# Patient Record
Sex: MALE | Race: WHITE | NOT HISPANIC OR LATINO | Employment: PART TIME | URBAN - METROPOLITAN AREA
[De-identification: names, ages, dates, MRNs, and addresses within clinical notes are randomized per-mention and may not be internally consistent; named-entity substitution may affect disease eponyms.]

---

## 2021-11-09 ENCOUNTER — OFFICE VISIT (OUTPATIENT)
Dept: BEHAVIORAL HEALTH | Facility: CLINIC | Age: 36
End: 2021-11-09
Payer: COMMERCIAL

## 2021-11-09 DIAGNOSIS — F33.1 MODERATE EPISODE OF RECURRENT MAJOR DEPRESSIVE DISORDER (HCC): ICD-10-CM

## 2021-11-09 PROCEDURE — 99204 OFFICE O/P NEW MOD 45 MIN: CPT | Mod: GC | Performed by: PSYCHIATRY & NEUROLOGY

## 2021-11-09 PROCEDURE — 96127 BRIEF EMOTIONAL/BEHAV ASSMT: CPT | Performed by: PSYCHIATRY & NEUROLOGY

## 2021-11-09 RX ORDER — DULOXETIN HYDROCHLORIDE 30 MG/1
30 CAPSULE, DELAYED RELEASE ORAL DAILY
Qty: 30 CAPSULE | Refills: 1 | Status: SHIPPED | OUTPATIENT
Start: 2021-11-09 | End: 2021-12-21 | Stop reason: SDUPTHER

## 2021-11-09 RX ORDER — DULOXETIN HYDROCHLORIDE 30 MG/1
CAPSULE, DELAYED RELEASE ORAL
Qty: 45 CAPSULE | Refills: 1 | Status: SHIPPED
Start: 2021-11-09 | End: 2021-11-09

## 2021-11-09 ASSESSMENT — PATIENT HEALTH QUESTIONNAIRE - PHQ9
5. POOR APPETITE OR OVEREATING: 1 - SEVERAL DAYS
CLINICAL INTERPRETATION OF PHQ2 SCORE: 6
SUM OF ALL RESPONSES TO PHQ QUESTIONS 1-9: 15

## 2021-11-09 ASSESSMENT — ANXIETY QUESTIONNAIRES
3. WORRYING TOO MUCH ABOUT DIFFERENT THINGS: MORE THAN HALF THE DAYS
7. FEELING AFRAID AS IF SOMETHING AWFUL MIGHT HAPPEN: NOT AT ALL
1. FEELING NERVOUS, ANXIOUS, OR ON EDGE: NEARLY EVERY DAY
4. TROUBLE RELAXING: NOT AT ALL
GAD7 TOTAL SCORE: 8
IF YOU CHECKED OFF ANY PROBLEMS ON THIS QUESTIONNAIRE, HOW DIFFICULT HAVE THESE PROBLEMS MADE IT FOR YOU TO DO YOUR WORK, TAKE CARE OF THINGS AT HOME, OR GET ALONG WITH OTHER PEOPLE: VERY DIFFICULT
5. BEING SO RESTLESS THAT IT IS HARD TO SIT STILL: NOT AT ALL
2. NOT BEING ABLE TO STOP OR CONTROL WORRYING: NOT AT ALL
6. BECOMING EASILY ANNOYED OR IRRITABLE: NEARLY EVERY DAY
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
## 2021-12-21 ENCOUNTER — OFFICE VISIT (OUTPATIENT)
Dept: BEHAVIORAL HEALTH | Facility: CLINIC | Age: 36
End: 2021-12-21
Payer: COMMERCIAL

## 2021-12-21 DIAGNOSIS — F33.1 MODERATE EPISODE OF RECURRENT MAJOR DEPRESSIVE DISORDER (HCC): ICD-10-CM

## 2021-12-21 PROCEDURE — 99213 OFFICE O/P EST LOW 20 MIN: CPT | Mod: GC | Performed by: PSYCHIATRY & NEUROLOGY

## 2021-12-21 RX ORDER — DULOXETIN HYDROCHLORIDE 30 MG/1
30 CAPSULE, DELAYED RELEASE ORAL DAILY
Qty: 90 CAPSULE | Refills: 0 | Status: SHIPPED
Start: 2021-12-21 | End: 2022-01-19
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
## 2021-12-24 ENCOUNTER — OFFICE VISIT (OUTPATIENT)
Dept: URGENT CARE | Facility: PHYSICIAN GROUP | Age: 36
End: 2021-12-24
Payer: COMMERCIAL

## 2021-12-24 VITALS
OXYGEN SATURATION: 100 % | DIASTOLIC BLOOD PRESSURE: 78 MMHG | WEIGHT: 179.4 LBS | HEART RATE: 98 BPM | BODY MASS INDEX: 25.68 KG/M2 | TEMPERATURE: 98.1 F | HEIGHT: 70 IN | SYSTOLIC BLOOD PRESSURE: 120 MMHG | RESPIRATION RATE: 12 BRPM

## 2021-12-24 DIAGNOSIS — N52.9 ERECTILE DYSFUNCTION, UNSPECIFIED ERECTILE DYSFUNCTION TYPE: ICD-10-CM

## 2021-12-24 DIAGNOSIS — L30.9 ECZEMA, UNSPECIFIED TYPE: ICD-10-CM

## 2021-12-24 PROCEDURE — 99203 OFFICE O/P NEW LOW 30 MIN: CPT | Performed by: FAMILY MEDICINE

## 2021-12-24 RX ORDER — BETAMETHASONE DIPROPIONATE 0.05 %
OINTMENT (GRAM) TOPICAL
Qty: 45 G | Refills: 1 | Status: SHIPPED
Start: 2021-12-24 | End: 2022-02-24

## 2021-12-24 NOTE — PROGRESS NOTES
"Subjective     Jerel Lopez is a 36 y.o. male who presents with Rash (Pt has a rash on upper back, neck, shoulder x 3 months off and on )             36-year-old otherwise healthy presenting for evaluation of a pruritic rash in the back off and on for the past 6 years.  He is new to town.  He does not have a PCP.  He also has trouble with erectile dysfunction  Review of system otherwise negative.      ROS           Objective     /78 (BP Location: Right arm, Patient Position: Sitting, BP Cuff Size: Adult)   Pulse 98   Temp 36.7 °C (98.1 °F) (Temporal)   Resp 12   Ht 1.778 m (5' 10\")   Wt 81.4 kg (179 lb 6.4 oz)   SpO2 100%   BMI 25.74 kg/m²      Physical Exam  Constitutional:       General: He is not in acute distress.     Appearance: He is not ill-appearing, toxic-appearing or diaphoretic.   HENT:      Head: Atraumatic.   Eyes:      Conjunctiva/sclera: Conjunctivae normal.   Cardiovascular:      Rate and Rhythm: Normal rate.   Pulmonary:      Effort: Pulmonary effort is normal. No respiratory distress.      Breath sounds: No stridor.   Skin:     Coloration: Skin is not jaundiced or pale.      Findings: Rash (Eczematous rash noted in the back seen in the outlined area.  No blisters or pustules) present.          Neurological:      Mental Status: He is alert and oriented to person, place, and time.   Psychiatric:         Behavior: Behavior normal.                             Assessment & Plan        1. Eczema, unspecified type  - betamethasone dipropionate (DIPROLENE) 0.05 % Ointment; Use on the affected area twice daily for 10-14 days  Dispense: 45 g; Refill: 1  - Referral to Dermatology    Trial of betamethasone referral to dermatology    2. Erectile dysfunction, unspecified erectile dysfunction type  - Referral to establish with [facility]    Discussed that this needs to be discussed with primary care and referral was placed.           "

## 2022-01-13 ENCOUNTER — OFFICE VISIT (OUTPATIENT)
Dept: MEDICAL GROUP | Facility: IMAGING CENTER | Age: 37
End: 2022-01-13
Payer: COMMERCIAL

## 2022-01-13 VITALS
HEART RATE: 80 BPM | SYSTOLIC BLOOD PRESSURE: 112 MMHG | DIASTOLIC BLOOD PRESSURE: 72 MMHG | RESPIRATION RATE: 14 BRPM | HEIGHT: 70 IN | BODY MASS INDEX: 25.62 KG/M2 | WEIGHT: 179 LBS | OXYGEN SATURATION: 100 % | TEMPERATURE: 97.7 F

## 2022-01-13 DIAGNOSIS — F32.A ANXIETY AND DEPRESSION: ICD-10-CM

## 2022-01-13 DIAGNOSIS — Z13.1 DIABETES MELLITUS SCREENING: ICD-10-CM

## 2022-01-13 DIAGNOSIS — F41.9 ANXIETY AND DEPRESSION: ICD-10-CM

## 2022-01-13 DIAGNOSIS — L30.8 OTHER ECZEMA: ICD-10-CM

## 2022-01-13 DIAGNOSIS — N52.9 ERECTILE DYSFUNCTION, UNSPECIFIED ERECTILE DYSFUNCTION TYPE: ICD-10-CM

## 2022-01-13 DIAGNOSIS — Z23 NEED FOR VACCINATION: ICD-10-CM

## 2022-01-13 DIAGNOSIS — Z11.3 SCREENING EXAMINATION FOR SEXUALLY TRANSMITTED DISEASE: ICD-10-CM

## 2022-01-13 DIAGNOSIS — Z13.6 SCREENING FOR CARDIOVASCULAR CONDITION: ICD-10-CM

## 2022-01-13 PROBLEM — L30.9 ECZEMA: Status: ACTIVE | Noted: 2022-01-13

## 2022-01-13 PROCEDURE — 90715 TDAP VACCINE 7 YRS/> IM: CPT | Performed by: CLINICAL NURSE SPECIALIST

## 2022-01-13 PROCEDURE — 99214 OFFICE O/P EST MOD 30 MIN: CPT | Mod: 25 | Performed by: CLINICAL NURSE SPECIALIST

## 2022-01-13 PROCEDURE — 90471 IMMUNIZATION ADMIN: CPT | Performed by: CLINICAL NURSE SPECIALIST

## 2022-01-13 ASSESSMENT — ANXIETY QUESTIONNAIRES
6. BECOMING EASILY ANNOYED OR IRRITABLE: NOT AT ALL
2. NOT BEING ABLE TO STOP OR CONTROL WORRYING: NOT AT ALL
5. BEING SO RESTLESS THAT IT IS HARD TO SIT STILL: SEVERAL DAYS
GAD7 TOTAL SCORE: 2
7. FEELING AFRAID AS IF SOMETHING AWFUL MIGHT HAPPEN: NOT AT ALL
4. TROUBLE RELAXING: NOT AT ALL
3. WORRYING TOO MUCH ABOUT DIFFERENT THINGS: NOT AT ALL
1. FEELING NERVOUS, ANXIOUS, OR ON EDGE: SEVERAL DAYS

## 2022-01-13 ASSESSMENT — PAIN SCALES - GENERAL: PAINLEVEL: NO PAIN

## 2022-01-13 ASSESSMENT — PATIENT HEALTH QUESTIONNAIRE - PHQ9: CLINICAL INTERPRETATION OF PHQ2 SCORE: 0

## 2022-01-13 NOTE — PROGRESS NOTES
"Subjective     Jerel Lopez is a 36 y.o. male who presents with Establish Care and Erectile Dysfunction            HPI  Jerel is in clinic today to establish care. He recently went to  for a chronic, intermittent rash x6 years.    Erectile dysfunction  In a new relationship and realized this has become an issue.  Been ocurring approximately 1-1.5 years. No abnormal penile discharge. No pain in abdomen or back.  No change in ED since starting Cymbalta.    Anxiety and depression  Started Cymbalta 1-2 months ago and this has been helping with both anxiety and depression.  No suicidal ideation currently but history of this. Seeing a therapist and psychiatrist.      Eczema  Bilateral shoulders, chronic intermittent. Uses steroid ointment as needed.      Review of Systems   Genitourinary: Negative.          No Known Allergies    Current Outpatient Medications on File Prior to Visit   Medication Sig Dispense Refill   • VITAMIN D PO Take  by mouth.     • betamethasone dipropionate (DIPROLENE) 0.05 % Ointment Use on the affected area twice daily for 10-14 days 45 g 1   • DULoxetine (CYMBALTA) 30 MG Cap DR Particles Take 1 Capsule by mouth every day. 90 Capsule 0     No current facility-administered medications on file prior to visit.           Objective     /72 (BP Location: Left arm, Patient Position: Sitting, BP Cuff Size: Adult)   Pulse 80   Temp 36.5 °C (97.7 °F) (Temporal)   Resp 14   Ht 1.778 m (5' 10\")   Wt 81.2 kg (179 lb)   SpO2 100%   BMI 25.68 kg/m²      Physical Exam  Constitutional:       General: He is not in acute distress.     Appearance: He is not ill-appearing, toxic-appearing or diaphoretic.   HENT:      Head: Normocephalic and atraumatic.   Eyes:      Pupils: Pupils are equal, round, and reactive to light.   Cardiovascular:      Rate and Rhythm: Normal rate. Rhythm regularly irregular.      Heart sounds: Normal heart sounds.   Pulmonary:      Effort: Pulmonary effort is normal.      Breath " sounds: Normal breath sounds.   Musculoskeletal:      Cervical back: No rigidity.   Lymphadenopathy:      Cervical: No cervical adenopathy.   Skin:     General: Skin is warm and dry.             Comments: Red, dry papules over upper back  Stretch marks over hips   Neurological:      Mental Status: He is alert and oriented to person, place, and time.   Psychiatric:         Mood and Affect: Mood normal.         Behavior: Behavior normal.         Thought Content: Thought content normal.         Judgment: Judgment normal.                    Assessment & Plan        1. Erectile dysfunction, unspecified erectile dysfunction type  Jerel has been experiencing ED since moving to [place].  This is been occurring for approximately 1-1/2 years.  He has no other symptoms.  Stretch marks were noted on his hips.  Labs ordered.  - Testosterone, Free & Total, Adult Male (w/SHBG); Future  -Cortisol    2. Screening examination for sexually transmitted disease    - T.PALLIDUM AB EIA; Future  - HIV AG/AB COMBO ASSAY SCREENING; Future  - Chlamydia/GC PCR Urine Or Swab; Future    3. Diabetes mellitus screening  Family history of diabetes  - Comp Metabolic Panel; Future  - HEMOGLOBIN A1C; Future    4. Screening for cardiovascular condition    - CBC WITH DIFFERENTIAL; Future  - Lipid Profile; Future  - TSH WITH REFLEX TO FT4; Future  - VITAMIN D,25 HYDROXY; Future    5. Anxiety and depression  Continue Cymbalta.  Report any worsening depression.    6. Other eczema  Back only.  Continue betamethasone ointment.  Apply bag balm to area for moisture.    7. Need for vaccination  Declined flu vaccine. Will go to [facility] for this.  - Tdap Vaccine =>8YO IM     Return if symptoms worsen or fail to improve, for With test results.

## 2022-01-13 NOTE — ASSESSMENT & PLAN NOTE
Started Cymbalta 1-2 months ago and this has been helping with both anxiety and depression.  No suicidal ideation currently but history of this. Seeing a therapist and psychiatrist.

## 2022-01-13 NOTE — ASSESSMENT & PLAN NOTE
In a new relationship and realized this has become an issue.  Been ocurring approximately 1-1.5 years. No abnormal penile discharge. No pain in abdomen or back.  No change in ED since starting Cymbalta.

## 2022-01-20 ENCOUNTER — HOSPITAL ENCOUNTER (OUTPATIENT)
Dept: LAB | Facility: MEDICAL CENTER | Age: 37
End: 2022-01-20
Attending: CLINICAL NURSE SPECIALIST
Payer: COMMERCIAL

## 2022-01-20 DIAGNOSIS — Z13.1 DIABETES MELLITUS SCREENING: ICD-10-CM

## 2022-01-20 DIAGNOSIS — Z13.6 SCREENING FOR CARDIOVASCULAR CONDITION: ICD-10-CM

## 2022-01-20 DIAGNOSIS — Z11.3 SCREENING EXAMINATION FOR SEXUALLY TRANSMITTED DISEASE: ICD-10-CM

## 2022-01-20 DIAGNOSIS — N52.9 ERECTILE DYSFUNCTION, UNSPECIFIED ERECTILE DYSFUNCTION TYPE: ICD-10-CM

## 2022-01-20 LAB
25(OH)D3 SERPL-MCNC: 32 NG/ML (ref 30–100)
ALBUMIN SERPL BCP-MCNC: 4.6 G/DL (ref 3.2–4.9)
ALBUMIN/GLOB SERPL: 1.8 G/DL
ALP SERPL-CCNC: 114 U/L (ref 30–99)
ALT SERPL-CCNC: 26 U/L (ref 2–50)
ANION GAP SERPL CALC-SCNC: 10 MMOL/L (ref 7–16)
AST SERPL-CCNC: 16 U/L (ref 12–45)
BASOPHILS # BLD AUTO: 0.5 % (ref 0–1.8)
BASOPHILS # BLD: 0.04 K/UL (ref 0–0.12)
BILIRUB SERPL-MCNC: 0.8 MG/DL (ref 0.1–1.5)
BUN SERPL-MCNC: 8 MG/DL (ref 8–22)
CALCIUM SERPL-MCNC: 9.5 MG/DL (ref 8.5–10.5)
CHLORIDE SERPL-SCNC: 99 MMOL/L (ref 96–112)
CHOLEST SERPL-MCNC: 207 MG/DL (ref 100–199)
CO2 SERPL-SCNC: 25 MMOL/L (ref 20–33)
CORTIS SERPL-MCNC: 6.5 UG/DL (ref 0–23)
CREAT SERPL-MCNC: 0.66 MG/DL (ref 0.5–1.4)
EOSINOPHIL # BLD AUTO: 0.14 K/UL (ref 0–0.51)
EOSINOPHIL NFR BLD: 1.8 % (ref 0–6.9)
ERYTHROCYTE [DISTWIDTH] IN BLOOD BY AUTOMATED COUNT: 38.6 FL (ref 35.9–50)
EST. AVERAGE GLUCOSE BLD GHB EST-MCNC: 103 MG/DL
FASTING STATUS PATIENT QL REPORTED: NORMAL
GLOBULIN SER CALC-MCNC: 2.6 G/DL (ref 1.9–3.5)
GLUCOSE SERPL-MCNC: 87 MG/DL (ref 65–99)
HBA1C MFR BLD: 5.2 % (ref 4–5.6)
HCT VFR BLD AUTO: 44.4 % (ref 42–52)
HDLC SERPL-MCNC: 60 MG/DL
HGB BLD-MCNC: 15 G/DL (ref 14–18)
HIV 1+2 AB+HIV1 P24 AG SERPL QL IA: NORMAL
IMM GRANULOCYTES # BLD AUTO: 0.01 K/UL (ref 0–0.11)
IMM GRANULOCYTES NFR BLD AUTO: 0.1 % (ref 0–0.9)
LDLC SERPL CALC-MCNC: 125 MG/DL
LYMPHOCYTES # BLD AUTO: 2.88 K/UL (ref 1–4.8)
LYMPHOCYTES NFR BLD: 36.2 % (ref 22–41)
MCH RBC QN AUTO: 29.4 PG (ref 27–33)
MCHC RBC AUTO-ENTMCNC: 33.8 G/DL (ref 33.7–35.3)
MCV RBC AUTO: 86.9 FL (ref 81.4–97.8)
MONOCYTES # BLD AUTO: 0.64 K/UL (ref 0–0.85)
MONOCYTES NFR BLD AUTO: 8.1 % (ref 0–13.4)
NEUTROPHILS # BLD AUTO: 4.24 K/UL (ref 1.82–7.42)
NEUTROPHILS NFR BLD: 53.3 % (ref 44–72)
NRBC # BLD AUTO: 0 K/UL
NRBC BLD-RTO: 0 /100 WBC
PLATELET # BLD AUTO: 314 K/UL (ref 164–446)
PMV BLD AUTO: 10.6 FL (ref 9–12.9)
POTASSIUM SERPL-SCNC: 4.7 MMOL/L (ref 3.6–5.5)
PROT SERPL-MCNC: 7.2 G/DL (ref 6–8.2)
RBC # BLD AUTO: 5.11 M/UL (ref 4.7–6.1)
SODIUM SERPL-SCNC: 134 MMOL/L (ref 135–145)
TREPONEMA PALLIDUM IGG+IGM AB [PRESENCE] IN SERUM OR PLASMA BY IMMUNOASSAY: NORMAL
TRIGL SERPL-MCNC: 109 MG/DL (ref 0–149)
TSH SERPL DL<=0.005 MIU/L-ACNC: 1.7 UIU/ML (ref 0.38–5.33)
WBC # BLD AUTO: 8 K/UL (ref 4.8–10.8)

## 2022-01-20 PROCEDURE — 84443 ASSAY THYROID STIM HORMONE: CPT

## 2022-01-20 PROCEDURE — 85025 COMPLETE CBC W/AUTO DIFF WBC: CPT

## 2022-01-20 PROCEDURE — 80053 COMPREHEN METABOLIC PANEL: CPT

## 2022-01-20 PROCEDURE — 80061 LIPID PANEL: CPT

## 2022-01-20 PROCEDURE — 86780 TREPONEMA PALLIDUM: CPT

## 2022-01-20 PROCEDURE — 84402 ASSAY OF FREE TESTOSTERONE: CPT

## 2022-01-20 PROCEDURE — 84403 ASSAY OF TOTAL TESTOSTERONE: CPT

## 2022-01-20 PROCEDURE — 87389 HIV-1 AG W/HIV-1&-2 AB AG IA: CPT

## 2022-01-20 PROCEDURE — 82533 TOTAL CORTISOL: CPT

## 2022-01-20 PROCEDURE — 36415 COLL VENOUS BLD VENIPUNCTURE: CPT

## 2022-01-20 PROCEDURE — 83036 HEMOGLOBIN GLYCOSYLATED A1C: CPT

## 2022-01-20 PROCEDURE — 84270 ASSAY OF SEX HORMONE GLOBUL: CPT

## 2022-01-20 PROCEDURE — 87491 CHLMYD TRACH DNA AMP PROBE: CPT

## 2022-01-20 PROCEDURE — 82306 VITAMIN D 25 HYDROXY: CPT

## 2022-01-20 PROCEDURE — 87591 N.GONORRHOEAE DNA AMP PROB: CPT

## 2022-01-21 LAB
C TRACH DNA SPEC QL NAA+PROBE: NEGATIVE
N GONORRHOEA DNA SPEC QL NAA+PROBE: NEGATIVE
SPECIMEN SOURCE: NORMAL

## 2022-01-23 LAB
SHBG SERPL-SCNC: 26 NMOL/L (ref 11–80)
TESTOST FREE MFR SERPL: 2.1 % (ref 1.6–2.9)
TESTOST FREE SERPL-MCNC: 80 PG/ML (ref 47–244)
TESTOST SERPL-MCNC: 387 NG/DL (ref 300–1080)

## 2022-02-24 ENCOUNTER — OFFICE VISIT (OUTPATIENT)
Dept: MEDICAL GROUP | Facility: IMAGING CENTER | Age: 37
End: 2022-02-24
Payer: COMMERCIAL

## 2022-02-24 VITALS
WEIGHT: 183 LBS | OXYGEN SATURATION: 98 % | HEIGHT: 70 IN | TEMPERATURE: 99 F | HEART RATE: 73 BPM | BODY MASS INDEX: 26.2 KG/M2 | SYSTOLIC BLOOD PRESSURE: 130 MMHG | DIASTOLIC BLOOD PRESSURE: 72 MMHG

## 2022-02-24 DIAGNOSIS — E78.00 HYPERCHOLESTEROLEMIA: ICD-10-CM

## 2022-02-24 DIAGNOSIS — E87.1 HYPONATREMIA: ICD-10-CM

## 2022-02-24 DIAGNOSIS — F41.9 ANXIETY AND DEPRESSION: ICD-10-CM

## 2022-02-24 DIAGNOSIS — L30.8 OTHER ECZEMA: ICD-10-CM

## 2022-02-24 DIAGNOSIS — N52.9 ERECTILE DYSFUNCTION, UNSPECIFIED ERECTILE DYSFUNCTION TYPE: ICD-10-CM

## 2022-02-24 DIAGNOSIS — F32.A ANXIETY AND DEPRESSION: ICD-10-CM

## 2022-02-24 PROCEDURE — 99214 OFFICE O/P EST MOD 30 MIN: CPT | Performed by: CLINICAL NURSE SPECIALIST

## 2022-02-24 RX ORDER — TADALAFIL 2.5 MG/1
2.5 TABLET ORAL DAILY
Qty: 30 TABLET | Refills: 2 | Status: SHIPPED
Start: 2022-02-24 | End: 2022-08-30

## 2022-02-24 ASSESSMENT — PAIN SCALES - GENERAL: PAINLEVEL: NO PAIN

## 2022-02-24 ASSESSMENT — FIBROSIS 4 INDEX: FIB4 SCORE: 0.36

## 2022-02-24 NOTE — ASSESSMENT & PLAN NOTE
Recently increased medication dosage as has worsened recently. He has an appointment with psychiatry next month.

## 2022-02-24 NOTE — PROGRESS NOTES
"Subjective     Jerel Lopez is a 36 y.o. male who presents with Lab Results            HPI  Erectile dysfunction  No known cardiovascular, hepatic or renal disease.  No penile malformation or curvature.  No pain with ejaculation.  Recent labs normal.    Eczema  Resolved currently.    Anxiety and depression  Recently increased medication dosage as has worsened recently. He has an appointment with psychiatry next month.      ROS  See HPI      No Known Allergies    Current Outpatient Medications on File Prior to Visit   Medication Sig Dispense Refill   • Cyanocobalamin (VITAMIN B12 PO) Take  by mouth.     • DULoxetine (CYMBALTA) 60 MG Cap DR Particles delayed-release capsule Take 1 Capsule by mouth every day. 30 Capsule 1   • VITAMIN D PO Take  by mouth.       No current facility-administered medications on file prior to visit.           Objective     /72 (BP Location: Left arm, Patient Position: Sitting, BP Cuff Size: Adult)   Pulse 73   Temp 37.2 °C (99 °F) (Temporal)   Ht 1.778 m (5' 10\")   Wt 83 kg (183 lb)   SpO2 98%   BMI 26.26 kg/m²      Physical Exam  Constitutional:       General: He is not in acute distress.     Appearance: He is not ill-appearing, toxic-appearing or diaphoretic.   HENT:      Head: Normocephalic and atraumatic.   Eyes:      Pupils: Pupils are equal, round, and reactive to light.   Cardiovascular:      Rate and Rhythm: Normal rate.   Pulmonary:      Effort: Pulmonary effort is normal.   Skin:     General: Skin is warm and dry.   Neurological:      Mental Status: He is alert and oriented to person, place, and time.   Psychiatric:         Mood and Affect: Mood normal.         Behavior: Behavior normal.         Thought Content: Thought content normal.         Judgment: Judgment normal.                             Assessment & Plan        1. Erectile dysfunction, unspecified erectile dysfunction type  No cardiac concerns. Alk phos slightly elevated but recently stopped " drinking alcohol so will recheck.  No concerns for penile deformity.  Tadalafil daily 2.5mg. Discussed all serious adverse effects from [employer] and common side effects.  Medication information handout provided in after visit summary.  Report priapism, cardiac symptoms. Return to care for BP check in 2 weeks.  - Tadalafil 2.5 MG Tab; Take 1 Tablet by mouth every day.  Dispense: 30 Tablet; Refill: 2    2. Other eczema  Continue to monitor    3. Anxiety and depression  Denies suicidal ideation.  He is taking 60 mg of Cymbalta.  Follow-up with psychiatry.    Return in about 2 weeks (around 3/10/2022), or if symptoms worsen or fail to improve, for for BP check Cialis.

## 2022-02-24 NOTE — ASSESSMENT & PLAN NOTE
No known cardiovascular, hepatic or renal disease.  No penile malformation or curvature.  No pain with ejaculation.  Recent labs normal.

## 2022-02-24 NOTE — PATIENT INSTRUCTIONS
Tadalafil tablets (Cialis)  What is this medicine?  TADALAFIL (leena DA la yonny) is used to treat erection problems in men. It is also used for enlargement of the prostate gland in men, a condition called benign prostatic hyperplasia or BPH. This medicine improves urine flow and reduces BPH symptoms. This medicine can also treat both erection problems and BPH when they occur together.  This medicine may be used for other purposes; ask your health care provider or pharmacist if you have questions.  COMMON BRAND NAME(S): Adcirca, ALYQ, Cialis  What should I tell my health care provider before I take this medicine?  They need to know if you have any of these conditions:  · bleeding disorders  · eye or vision problems, including a rare inherited eye disease called retinitis pigmentosa  · anatomical deformation of the penis, Peyronie's disease, or history of priapism (painful and prolonged erection)  · heart disease, angina, a history of heart attack, irregular heart beats, or other heart problems  · high or low blood pressure  · history of blood diseases, like sickle cell anemia or leukemia  · history of stomach bleeding  · kidney disease  · liver disease  · stroke  · an unusual or allergic reaction to tadalafil, other medicines, foods, dyes, or preservatives  · pregnant or trying to get pregnant  · breast-feeding  How should I use this medicine?  Take this medicine by mouth with a glass of water. Follow the directions on the prescription label. You may take this medicine with or without meals. When this medicine is used for erection problems, your doctor may prescribe it to be taken once daily or as needed. If you are taking the medicine as needed, you may be able to have sexual activity 30 minutes after taking it and for up to 36 hours after taking it. Whether you are taking the medicine as needed or once daily, you should not take more than one dose per day. If you are taking this medicine for symptoms of benign  prostatic hyperplasia (BPH) or to treat both BPH and an erection problem, take the dose once daily at about the same time each day. Do not take your medicine more often than directed.  Talk to your pediatrician regarding the use of this medicine in children. Special care may be needed.  Overdosage: If you think you have taken too much of this medicine contact a poison control center or emergency room at once.  NOTE: This medicine is only for you. Do not share this medicine with others.  What if I miss a dose?  If you are taking this medicine as needed for erection problems, this does not apply. If you miss a dose while taking this medicine once daily for an erection problem, benign prostatic hyperplasia, or both, take it as soon as you remember, but do not take more than one dose per day.  What may interact with this medicine?  Do not take this medicine with any of the following medications:  · nitrates like amyl nitrite, isosorbide dinitrate, isosorbide mononitrate, nitroglycerin  · other medicines for erectile dysfunction like avanafil, sildenafil, vardenafil  · other tadalafil products (Adcirca)  · riociguat  This medicine may also interact with the following medications:  · certain drugs for high blood pressure  · certain drugs for the treatment of HIV infection or AIDS  · certain drugs used for fungal or yeast infections, like fluconazole, itraconazole, ketoconazole, and voriconazole  · certain drugs used for seizures like carbamazepine, phenytoin, and phenobarbital  · grapefruit juice  · macrolide antibiotics like clarithromycin, erythromycin, troleandomycin  · medicines for prostate problems  · rifabutin, rifampin or rifapentine  This list may not describe all possible interactions. Give your health care provider a list of all the medicines, herbs, non-prescription drugs, or dietary supplements you use. Also tell them if you smoke, drink alcohol, or use illegal drugs. Some items may interact with your  medicine.  What should I watch for while using this medicine?  If you notice any changes in your vision while taking this drug, call your doctor or health care professional as soon as possible. Stop using this medicine and call your health care provider right away if you have a loss of sight in one or both eyes.  Contact your doctor or health care professional right away if the erection lasts longer than 4 hours or if it becomes painful. This may be a sign of serious problem and must be treated right away to prevent permanent damage.  If you experience symptoms of nausea, dizziness, chest pain or arm pain upon initiation of sexual activity after taking this medicine, you should refrain from further activity and call your doctor or health care professional as soon as possible.  Do not drink alcohol to excess (examples, 5 glasses of wine or 5 shots of whiskey) when taking this medicine. When taken in excess, alcohol can increase your chances of getting a headache or getting dizzy, increasing your heart rate or lowering your blood pressure.  Using this medicine does not protect you or your partner against HIV infection (the virus that causes AIDS) or other sexually transmitted diseases.  What side effects may I notice from receiving this medicine?  Side effects that you should report to your doctor or health care professional as soon as possible:  · allergic reactions like skin rash, itching or hives, swelling of the face, lips, or tongue  · breathing problems  · changes in hearing  · changes in vision  · chest pain  · fast, irregular heartbeat  · prolonged or painful erection  · seizures  Side effects that usually do not require medical attention (report to your doctor or health care professional if they continue or are bothersome):  · back pain  · dizziness  · flushing  · headache  · indigestion  · muscle aches  · nausea  · stuffy or runny nose  This list may not describe all possible side effects. Call your doctor  for medical advice about side effects. You may report side effects to FDA at 4-113-QQL-1706.  Where should I keep my medicine?  Keep out of the reach of children.  Store at room temperature between 15 and 30 degrees C (59 and 86 degrees F). Throw away any unused medicine after the expiration date.  NOTE: This sheet is a summary. It may not cover all possible information. If you have questions about this medicine, talk to your doctor, pharmacist, or health care provider.  © 2020 [employer] (2015-05-08 13:15:49)

## 2022-03-22 ENCOUNTER — OFFICE VISIT (OUTPATIENT)
Dept: BEHAVIORAL HEALTH | Facility: CLINIC | Age: 37
End: 2022-03-22
Payer: COMMERCIAL

## 2022-03-22 DIAGNOSIS — F33.1 MODERATE EPISODE OF RECURRENT MAJOR DEPRESSIVE DISORDER (HCC): ICD-10-CM

## 2022-03-22 PROCEDURE — 99214 OFFICE O/P EST MOD 30 MIN: CPT | Mod: GC | Performed by: STUDENT IN AN ORGANIZED HEALTH CARE EDUCATION/TRAINING PROGRAM

## 2022-03-22 RX ORDER — DULOXETIN HYDROCHLORIDE 60 MG/1
60 CAPSULE, DELAYED RELEASE ORAL DAILY
Qty: 30 CAPSULE | Refills: 1 | Status: SHIPPED | OUTPATIENT
Start: 2022-03-22 | End: 2022-04-20 | Stop reason: SDUPTHER

## 2022-03-22 ASSESSMENT — ANXIETY QUESTIONNAIRES
6. BECOMING EASILY ANNOYED OR IRRITABLE: SEVERAL DAYS
2. NOT BEING ABLE TO STOP OR CONTROL WORRYING: NOT AT ALL
4. TROUBLE RELAXING: NOT AT ALL
7. FEELING AFRAID AS IF SOMETHING AWFUL MIGHT HAPPEN: NOT AT ALL
3. WORRYING TOO MUCH ABOUT DIFFERENT THINGS: NOT AT ALL
1. FEELING NERVOUS, ANXIOUS, OR ON EDGE: SEVERAL DAYS
GAD7 TOTAL SCORE: 3
5. BEING SO RESTLESS THAT IT IS HARD TO SIT STILL: SEVERAL DAYS

## 2022-03-22 ASSESSMENT — PATIENT HEALTH QUESTIONNAIRE - PHQ9
5. POOR APPETITE OR OVEREATING: 3 - NEARLY EVERY DAY
SUM OF ALL RESPONSES TO PHQ QUESTIONS 1-9: 15
CLINICAL INTERPRETATION OF PHQ2 SCORE: 3
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
## 2022-04-20 ENCOUNTER — OFFICE VISIT (OUTPATIENT)
Dept: BEHAVIORAL HEALTH | Facility: CLINIC | Age: 37
End: 2022-04-20
Payer: COMMERCIAL

## 2022-04-20 DIAGNOSIS — F33.1 MODERATE EPISODE OF RECURRENT MAJOR DEPRESSIVE DISORDER (HCC): ICD-10-CM

## 2022-04-20 PROCEDURE — 99214 OFFICE O/P EST MOD 30 MIN: CPT | Mod: GC | Performed by: STUDENT IN AN ORGANIZED HEALTH CARE EDUCATION/TRAINING PROGRAM

## 2022-04-20 RX ORDER — BUPROPION HYDROCHLORIDE 150 MG/1
TABLET ORAL
Qty: 45 TABLET | Refills: 0 | Status: SHIPPED | OUTPATIENT
Start: 2022-04-20 | End: 2022-05-16

## 2022-04-20 RX ORDER — DULOXETIN HYDROCHLORIDE 60 MG/1
60 CAPSULE, DELAYED RELEASE ORAL DAILY
Qty: 30 CAPSULE | Refills: 1 | Status: SHIPPED
Start: 2022-04-20 | End: 2022-05-20

## 2022-04-20 ASSESSMENT — PATIENT HEALTH QUESTIONNAIRE - PHQ9
5. POOR APPETITE OR OVEREATING: 3 - NEARLY EVERY DAY
CLINICAL INTERPRETATION OF PHQ2 SCORE: 5
SUM OF ALL RESPONSES TO PHQ QUESTIONS 1-9: 14

## 2022-04-20 ASSESSMENT — ANXIETY QUESTIONNAIRES
3. WORRYING TOO MUCH ABOUT DIFFERENT THINGS: NOT AT ALL
2. NOT BEING ABLE TO STOP OR CONTROL WORRYING: NOT AT ALL
5. BEING SO RESTLESS THAT IT IS HARD TO SIT STILL: NOT AT ALL
GAD7 TOTAL SCORE: 2
1. FEELING NERVOUS, ANXIOUS, OR ON EDGE: MORE THAN HALF THE DAYS
4. TROUBLE RELAXING: NOT AT ALL
6. BECOMING EASILY ANNOYED OR IRRITABLE: NOT AT ALL
7. FEELING AFRAID AS IF SOMETHING AWFUL MIGHT HAPPEN: NOT AT ALL
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
## 2022-05-15 DIAGNOSIS — F33.1 MODERATE EPISODE OF RECURRENT MAJOR DEPRESSIVE DISORDER (HCC): ICD-10-CM

## 2022-05-16 RX ORDER — BUPROPION HYDROCHLORIDE 150 MG/1
TABLET ORAL
Qty: 45 TABLET | Refills: 0 | Status: SHIPPED
Start: 2022-05-16 | End: 2022-05-20

## 2022-05-20 ENCOUNTER — OFFICE VISIT (OUTPATIENT)
Dept: BEHAVIORAL HEALTH | Facility: CLINIC | Age: 37
End: 2022-05-20
Payer: COMMERCIAL

## 2022-05-20 DIAGNOSIS — F33.41 RECURRENT MAJOR DEPRESSIVE DISORDER, IN PARTIAL REMISSION (HCC): ICD-10-CM

## 2022-05-20 DIAGNOSIS — F33.1 MODERATE EPISODE OF RECURRENT MAJOR DEPRESSIVE DISORDER (HCC): ICD-10-CM

## 2022-05-20 PROCEDURE — 99213 OFFICE O/P EST LOW 20 MIN: CPT | Mod: GC | Performed by: PSYCHIATRY & NEUROLOGY

## 2022-05-20 RX ORDER — BUPROPION HYDROCHLORIDE 300 MG/1
300 TABLET ORAL EVERY MORNING
Qty: 90 TABLET | Refills: 0 | Status: SHIPPED | OUTPATIENT
Start: 2022-05-20 | End: 2022-08-01 | Stop reason: SDUPTHER

## 2022-05-20 RX ORDER — DULOXETIN HYDROCHLORIDE 30 MG/1
30 CAPSULE, DELAYED RELEASE ORAL DAILY
Qty: 30 CAPSULE | Refills: 2 | Status: SHIPPED | OUTPATIENT
Start: 2022-05-20 | End: 2022-08-01 | Stop reason: SDUPTHER

## 2022-05-20 ASSESSMENT — ANXIETY QUESTIONNAIRES
4. TROUBLE RELAXING: NOT AT ALL
5. BEING SO RESTLESS THAT IT IS HARD TO SIT STILL: NOT AT ALL
1. FEELING NERVOUS, ANXIOUS, OR ON EDGE: NOT AT ALL
GAD7 TOTAL SCORE: 1
7. FEELING AFRAID AS IF SOMETHING AWFUL MIGHT HAPPEN: NOT AT ALL
2. NOT BEING ABLE TO STOP OR CONTROL WORRYING: NOT AT ALL
3. WORRYING TOO MUCH ABOUT DIFFERENT THINGS: NOT AT ALL
6. BECOMING EASILY ANNOYED OR IRRITABLE: SEVERAL DAYS

## 2022-05-20 ASSESSMENT — PATIENT HEALTH QUESTIONNAIRE - PHQ9
SUM OF ALL RESPONSES TO PHQ QUESTIONS 1-9: 4
5. POOR APPETITE OR OVEREATING: 0 - NOT AT ALL
CLINICAL INTERPRETATION OF PHQ2 SCORE: 1
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
## 2022-08-01 DIAGNOSIS — F33.1 MODERATE EPISODE OF RECURRENT MAJOR DEPRESSIVE DISORDER (HCC): ICD-10-CM

## 2022-08-01 RX ORDER — DULOXETIN HYDROCHLORIDE 30 MG/1
30 CAPSULE, DELAYED RELEASE ORAL DAILY
Qty: 30 CAPSULE | Refills: 2 | Status: SHIPPED
Start: 2022-08-01 | End: 2022-08-30

## 2022-08-01 RX ORDER — BUPROPION HYDROCHLORIDE 300 MG/1
300 TABLET ORAL EVERY MORNING
Qty: 90 TABLET | Refills: 0 | Status: SHIPPED | OUTPATIENT
Start: 2022-08-01 | End: 2022-08-30 | Stop reason: SDUPTHER

## 2022-08-01 NOTE — TELEPHONE ENCOUNTER
Upcoming appt. 09/09    Received request via: Patient    Was the patient seen in the last year in this department? Yes    Does the patient have an active prescription (recently filled or refills available) for medication(s) requested? No

## 2022-08-30 ENCOUNTER — OFFICE VISIT (OUTPATIENT)
Dept: BEHAVIORAL HEALTH | Facility: CLINIC | Age: 37
End: 2022-08-30
Payer: COMMERCIAL

## 2022-08-30 VITALS — SYSTOLIC BLOOD PRESSURE: 116 MMHG | OXYGEN SATURATION: 92 % | HEART RATE: 80 BPM | DIASTOLIC BLOOD PRESSURE: 64 MMHG

## 2022-08-30 DIAGNOSIS — F60.6 AVOIDANT PERSONALITY DISORDER (HCC): ICD-10-CM

## 2022-08-30 DIAGNOSIS — F40.10 SOCIAL ANXIETY DISORDER: ICD-10-CM

## 2022-08-30 DIAGNOSIS — F33.1 MODERATE EPISODE OF RECURRENT MAJOR DEPRESSIVE DISORDER (HCC): ICD-10-CM

## 2022-08-30 PROCEDURE — 99214 OFFICE O/P EST MOD 30 MIN: CPT | Performed by: PSYCHIATRY & NEUROLOGY

## 2022-08-30 RX ORDER — SPIRONOLACTONE 50 MG/1
50 TABLET, FILM COATED ORAL 2 TIMES DAILY
COMMUNITY
Start: 2022-07-22 | End: 2023-07-17

## 2022-08-30 RX ORDER — ESTRADIOL 2 MG/1
4 TABLET ORAL DAILY
COMMUNITY
Start: 2022-07-22 | End: 2023-07-17

## 2022-08-30 RX ORDER — BUPROPION HYDROCHLORIDE 300 MG/1
300 TABLET ORAL EVERY MORNING
Qty: 90 TABLET | Refills: 0 | Status: SHIPPED | OUTPATIENT
Start: 2022-08-30 | End: 2022-11-14 | Stop reason: SDUPTHER

## 2022-08-30 RX ORDER — PROPRANOLOL HYDROCHLORIDE 10 MG/1
10 TABLET ORAL 3 TIMES DAILY
Qty: 90 TABLET | Refills: 2 | Status: SHIPPED | OUTPATIENT
Start: 2022-08-30 | End: 2022-09-29

## 2022-08-30 ASSESSMENT — ENCOUNTER SYMPTOMS
GASTROINTESTINAL NEGATIVE: 1
DEPRESSION: 0
MUSCULOSKELETAL NEGATIVE: 1
DECREASED ENERGY: 0
NEUROLOGICAL NEGATIVE: 1
EYES NEGATIVE: 1
PANIC: 0
PREMATURE MORNING AWAKENING: 1
NERVOUS/ANXIOUS: 1
RESPIRATORY NEGATIVE: 1
HALLUCINATIONS: 0
HYPERSEXUAL: 0
PARANOIA: 0
DELUSIONS: 0
DIFFICULTY FALLING ASLEEP: 1
CARDIOVASCULAR NEGATIVE: 1
MEMORY LOSS: 0
CONSTITUTIONAL NEGATIVE: 1
AWAKENING FROM SLEEP: 1
AGORAPHOBIA: 0

## 2022-08-30 ASSESSMENT — SOCIAL DETERMINANTS OF HEALTH (SDOH): ANXIETY ASSOCIATED WITH SOCIAL SUPPORT SYSTEM: 1

## 2022-08-30 ASSESSMENT — LIFESTYLE VARIABLES: SUBSTANCE_ABUSE: 0
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
## 2022-09-16 ENCOUNTER — TELEPHONE (OUTPATIENT)
Dept: BEHAVIORAL HEALTH | Facility: CLINIC | Age: 37
End: 2022-09-16
Payer: COMMERCIAL

## 2022-09-16 DIAGNOSIS — R41.840 ATTENTION AND CONCENTRATION DEFICIT: ICD-10-CM

## 2022-09-16 RX ORDER — GABAPENTIN 100 MG/1
100 CAPSULE ORAL 3 TIMES DAILY
Qty: 90 CAPSULE | Refills: 2 | Status: SHIPPED
Start: 2022-09-16 | End: 2022-11-29

## 2022-10-03 ENCOUNTER — TELEPHONE (OUTPATIENT)
Dept: SPEECH THERAPY | Facility: OTHER | Age: 37
End: 2022-10-03

## 2022-10-04 ENCOUNTER — TELEPHONE (OUTPATIENT)
Dept: SPEECH THERAPY | Facility: OTHER | Age: 37
End: 2022-10-04

## 2022-10-05 ENCOUNTER — SPEECH THERAPY (OUTPATIENT)
Dept: SPEECH THERAPY | Facility: OTHER | Age: 37
End: 2022-10-05
Payer: COMMERCIAL

## 2022-10-05 DIAGNOSIS — R49.8 OTHER VOICE AND RESONANCE DISORDERS: ICD-10-CM

## 2022-10-05 PROCEDURE — 92524 BEHAVRAL QUALIT ANALYS VOICE: CPT | Mod: GN | Performed by: SPEECH-LANGUAGE PATHOLOGIST

## 2022-10-10 NOTE — OP THERAPY EVALUATION
Outpatient Speech Therapy  INITIAL EVALUATION    [facility] SPEECH PATHOLOGY AND AUDIOLOGY  [address] [place] [id number]  Phone:  [phone]  Fax:  [phone]    Date of Evaluation: 10/05/2022    Patient: Jerel Lopez  YOB: 1985  MRN: [id number]     Referring Provider: BORA Brambila, [facility]   Referring Diagnosis F64.9 Gender Identity Disorder,Unspecified     Time Calculation      1:00pm  3:00pm  2 hours           Chief Complaint: Speech Evaluation    Visit Diagnoses     ICD-10-CM   1. Other voice and resonance disorders  R49.8     Subjective Evaluation  Past Medical History:   Diagnosis Date   • Seizure (HCC)     seizure like episodes twice as teenager and early 20s, not evaluated further      Past Surgical History:   Procedure Laterality Date   • DENTAL SURGERY         Background Information:  Jerel Lopez is a 36 year 36-omzew-wia nonbinary individual who was referred by BORA Brambila at [facility] to the [facility] ([facility] Speech [facility] Hearing Clinic for a voice and communication evaluation (F64.9 gender identity disorder, unspecified). Jerel was interested in moving their voice in a more androgynous and femme direction. They were assessed on October 5th, 2022, at [facility] to gather data about their current voice and to collect baseline data for their ideal voice Jerel's medical history is remarkable.     While they did not report any major surgeries, Jerel did report a history of seizures that occurred in their early twenties. They take Bupropion 300mg/day to treat depression, Gabapentin 300mg/day as an anticonvulsant, a Vitamin D supplement daily, and a Vitamin B12 supplement daily as they eat mostly vegan meals.      Jerel began their transition initially about three months ago. Jerel is currently taking the hormones Estradiol at 6mg/day and Spironolactone at 100mg/day and has been for approximately the last three months. Jerel  believes that a newfound dryness of the lips can be attributed to the Spironolactone. They reported previous attempts of watching YouTube videos, following [employer] guides, and participating in Discord trans voice groups. Jerel also tested several different apps including Voice Tools in the past. Jerel reported while following and utilizing these resources, they may have inadvertently caused damage to their voice. They have not received any surgical procedures and reported that they do not have any plans on having cosmetic and/or facial or vocal surgery.     Jerel is currently employed part time and preferred not to disclose details surrounding their occupation/employer. They did share however, that the environment is relatively joel and could potentially be attributed to any voice difficulties or presentation of a “weaker voice.” They reported using their voice for about 25% of their workday. Jerel is currently enrolled as a college freshman at [employer] ([employer]). 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 Jerel reported that they are well supported by their friends and have not had contact with immediate family for an extended amount of time. Jerel presents as queer/center spectrum when interacting with friends.     Jerel reported their vocal activity as being recreational and vocational. They reported rarely talking on the phone and only talking in conversation 1-2 hours per day when speaking to friends or coworkers. Jerel reported talking in a noisy setting for about 1 hour per day, yelling and shouting for about 0 hours per day and whispering for 0 hours per day. Jerel also reported intermittently coughing and clearing their throat throughout the day. They have a water intake of about 8 glasses of water per day, and 2 cups of coffee or tea per day. Jerel is a former smoker of approximately 1-3 cigarettes per day and does not drink alcohol. They did report  recreational drug use of psychedelics, including “shrooms” and ecstasy. Additionally, they mentioned that they used to practice sword swallowing and thought they may have damaged their vocal folds.     Evaluation & Assessment  In order to collect baseline measures of Rashi's current voice, they were asked to perform several different speaking tasks. Tasks for collecting both objective and subjective data were conducted. [employer] was not used to collect objective measures.  Instead, the vanessa “Voice Tools” was used for the purpose of collecting the mean, maximum, and minimum frequencies (measured in Hz) of Rashi's current voice. One clinician collected data using the Voice Tools vanessa and a recorder in order to get the most valid and reliable results.  Jerel was given the option to take their mask off while the clinicians kept their mask on to collect objective and subjective data.  Jerel selected to remove their mask throughout the duration of the assessment.     Objective Measures   Voice and Fluency   Due to Jerel's desire to receive therapy to achieve a more androgynous voice, objective measures were collected as baseline measures for a treatment plan. Pitch is defined as the subjective quality of sound as perceived by highness or lowness of a tone and is measured by the cycles per second (Hertz, Hz) of the acoustic waveforms that are produced by the vocal folds during phonation. Cisgender males typically have a pitch between 100 and 140 Hz, gender-neutral pitch is typically between 145 and 175 Hz, and cisgender females typically have a pitch between 180 and 220 Hz. Jerel participated in seven objective tasks to collect baseline data.      Jerel's results of the tasks are displayed in the chart below.   Task   Prompt   Mean Hz   Minimum Hz   Maximum Hz   Speaking Fundamental Frequency   Jaden lives in [place], [place].   164.2 139.4 255.7    The boys won every game.   176.9 145.0 208.9   Slide Low    179.1 143.2 245.2   Slide  High    224.8 147.7 283.4   Habitual Sentences   Mixed: “He had two rock lizards.”    227.6 133.8 369.9    High Front: “Santiago sees the sleepy kids.”   193.1 148.0 223.7    High back: “Sintia took the old blue shoes.”    190.1 139.3 325.0    Low Front: “Ester has Ericka's red cat.”   155.7 137.3 179.4    Low back: “Father got all four cards.”     159.6 158.5 161.0   Comfortable /a/       154.8 82.0 167.2   Maximum /a/       167.8 at 15 seconds - -   Open-Ended Question   Do you think kierra is art? Why or why not?    146.2  123.6 197.8        Overall, Jerel's speaking fundamental frequency (a measurement of the overall average of an individual's speaking voice) was 170.55 HZ, which was within the gender-neutral range. Their fundamental frequency was calculated by finding the average of the two sentences from the same task (listed as task “speaking fundamental frequency” in the table above). Their speaking fundamental frequency with different vowels in habitual sentences was found to be 185.22 Hz, which was within the cis female range. This was calculated by finding the average range of five different sentences, each with varying presence of vowel sounds (listed in the table above as “Habitual Sentences”).     Jerel's vocal range was calculated using slide low and slide high tasks. An octave higher is measured when one frequency is twice that of a frequency it is compared to (i.e., there is a pitch range of about 1 octave between 150 and 300 Hz.) An average adult pitch range is approximately two octaves. Jerel had a pitch range of 1 ¼  octave, as demonstrated by their minimum frequency of 179.1 Hz in the slide low task and their maximum frequency of 224.8 Hz in the slide high task. This is an indicator of reduced ability to achieve a wide pitch range with an average pitch range of 2 ½ octaves.     Responses to open-ended questions provided measures of fundamental frequency during conversational speech with a higher  cognitive-linguistic demand. In this task, Jerel's voice presented with a mean of 146.2 Hz and a range of 74.2 Hz. This was calculated by taking the difference between maximum and minimum frequencies in the open-ended question task (197.8-123.6 Hz). Results indicated a gender-neutral average pitch during conversation and a likely capacity to fluctuate between gender neutral and a more cisgender male voice during more complex vocal tasks.     Measures were taken during the comfortable and maximum /a/ phonation. At the phonemic level, Jerel's fundamental frequency during comfortable /a/ phonation was measured at 154.8 Hz, placing them within the gender-neutral range. Their fundamental frequency during maximum /a/ phonation was measured at 167.8 Hz, placing them within the gender-neutral range. Jerel's maximum phonation time (how long they were able to phonate /a/) was 15.0 seconds, which is the average in most adults.      Subjective Measures   Different subjective measures were collected to track future therapy progress.        Voice Handicap Index (VHI)   The Voice Handicap Index (VHI) is a subjective measure of the level of impact a voice impairment has on an individual's quality of life. Although the VHI is intended to detect vocal pathologies in a broad range of clients who believe they may have a vocal disorder, it does not refer to the role of voice in gender identity. The VHI was used to determine Jerel's vocal quality of life and any potential vocal pathologies that could impact their ability to modify their voice.  Further, it was used to discuss vocal quality of life in reference to Jerel's identity.  Functional, physical, and emotional areas were examined. Scores are rated from mild to severe handicaps. An overall score from 0-30 is considered mild, 31-60 is considered moderate, and  is considered severe for clients with vocal disorders.       Jerel's scores and impacts gathered from the VHI are displayed  in the chart below:   Area   Score   Quality of Life Impact     Based on scores   Functional   6 Mild concerns    Physical    10 Mild concerns    Emotional   0        Overall, Jerel scored a 16/100 on the VHI, placing them within the mild range of severity for clients with vocal disorders. The areas in which Jerel was the most impacted were the functional and physical areas.  Further exploration was conducted to determine if this score was related to their gender identity.      Regarding functional questions, Jerel reported that there were no situations in which they feel that they have a functional voice issue with their vocal mechanism. Jerel mentioned that their voice makes it difficult for others to hear them and people will ask them to repeat themselves when speaking face to face. This may be related to their concerns regarding their projection, but they indicated no concerns with loudness.     Regarding physical questions, although Jerel reported their scores in the physical area as being reflective of minor physical difficulties when speaking, after discussion it appears there are no physical concerns. For example, Jerel interpreted the question regarding how their voice varies throughout the day as inconsistent because they thought it was referring to their appropriate vocal affect. Jerel also reported that their voice is sometimes worse in the evening, which may be due to their dust allergy and exposure in their work environment rather than a physical problem.      Regarding emotional questions, Jerel reported their score in the emotional area as being reflective of them liking their voice. They stated that before recording it, they thought it was deeper than it actually is. They expressed that they just wanted to decrease the masculinity in their voice and explore other aspects of voice.    After discussing responses with Jerel, it was determined that their responses were based on functional and physical factors  rather than emotional ones about their voice not matching their gender.      Based on clinical observation and discussion with Jerel, there were no immediate functional or physical concerns regarding Jerel's voice at this time. It is likely that voice and communication therapy will improve their vocal related quality of life.      Voice-Related Experiences of Nonbinary Individuals (MARILIN)   The Voice-Related Experiences of Nonbinary Individuals (MARILIN) is a subjective measure that assesses the diverse, voice related experiences among nonbinary individuals. The MARILIN is a 17-question survey in which a respondent answers each question on a scale from 1 to 4, denoted as follows: “1” meaning “never or rarely”, “2” indicates “sometimes”, “3” indicates “often”, and “4” meaning “usually or always”.  Lower scores are representative of a higher quality of life in relation to one's voice, while higher scores are indicative of impacted quality of life as it relates to one's voice.      Jerel's scores on the MARILIN are shown in the chart below.   Subtest Score   Physical Items 15/28   Functional Items 13/32   Emotional Items 2/8   Total 30/68       Overall, Jerel's responses on the MARILIN indicated that their vocal quality was impacting their overall quality of life as demonstrated by their score of 30 out of 68.      On the MARILIN, they reported ratings of “1” and “2” for most questions. However, they reported a minimal amount of “3” and “4” identifying the following: quality of voice varies throughout the day, some emotions cause pitch to change beyond their control, their voice changes unexpectedly depending on the situation, they suspect that people misgender them because of their voice, and their voice gets in the way of them living as themself. Jerel's main goal is to decrease the masculinity in their voice to result in a more androgynous voice.      Based on these results, it is likely that voice and communication therapy will improve  Jerel's vocal quality of life.     Voice Measures   Subjective data was collected to provide information about Jerel's voice to a naïve listener. Tasks included holding the vowels /a/ and /i/ maximally, reading sentences, and engaging in spontaneous speech by answering two questions. The audio recordings collected can also be used as Jerel advances in therapy as a method of monitoring their progress. The clinicians rated Jerel's voice while performing these tasks on gender (“very male”, “somewhat male”, “gender neutral”, “somewhat female”, and “female”) and femininity (“very masculine,” “masculine,” “gender neutral,” “feminine,” and “very feminine”).      Results on these tasks are listed below:      Task   Gender   Femininity     Sustained Vowels   Gender neutral/Somewhat Male Gender Neutral/Masculine   Sentence Production   Gender neutral/Somewhat Male Gender Neutral/Masculine   Grandfather Passage   Gender neutral/Somewhat Male Gender Neutral/Masculine   Spontaneous Speech   Gender neutral/Somewhat Male Gender Neutral/Masculine       Overall, the clinicians perceived Jerel's voice to be gender neutral and someone male in gender, as well as gender neutral and masculine in femininity for all linguistic/cognitive tasks.     Resonance during all speaking tasks was considered to be a majority in the chest. At times, there were instances of forward oral resonance during the initiation of a sentence or passage in non-spontaneous speaking tasks.  For example, Jerel had moments of oral forward resonance during the Grandfather Passage. Jerel reported that they were using a “narrative” voice during this task than a natural voice.  Based on these results, there is room for Jerel to modify their voice and communication to match their ideal voice.      Observations:  Prior to the evaluation, Jerel stated “It made me feel worse about the session for sure” in response to completing case history questionnaires. Jerel arrived on time for  their session, but when they arrived, they stated they were overwhelmed at the number of people in the room and requested to leave. Additionally, they said they felt they were utilizing resources for other people that have “real voice problems”.  After the graduate clinicians left and Jerel spoke one-on-one with the supervisor regarding [facility] as a training institution, diagnostic evaluation process, and therapy, they decided to proceed with the session with one  at a time. Jerel appeared more comfortable after they had time to visually take in the environment and understand the diagnostic process. Jerel appeared happy to complete all the subjective and objective tests as they became curious and were asking the clinicians questions about the data they were taking... At the end of the session, Jerel seemed relaxed and said that they did not mind how many people were in the room to summarize the session and go over the next steps because they were now comfortable.     Summary of Findings:  Overall, Jerel would be a good candidate to modify their voice and communication to be less masculine and in the femme direction (R49.8 Other voice and resonance disorders).  Based on the objective data, Jerel's average pitch for all speaking tasks fell within the gender neutral to cisgender female range (145-220 Hz). Their average speaking fundamental frequency was 170.55 Hz (gender neutral). They used an overall higher pitch when required to use a variety of vowels at the sentence level (up to 227.6 Hz, cis female).     Based on the subjective data collected throughout the evaluation process, Dainas voice and resonance was perceived as “gender-neutral” and “somewhat male” in all tasks (sustained vowels, sentence production, grandfather passage, and spontaneous speech). Resonance was considered to be in the chest for a majority of spontaneous speaking tasks.      Based on the objective and subjective data, and the voice  questionnaires collected through the evaluation process, Jerel qualifies for voice and communication gender-affirming therapy at the [facility] Transgender Voice and Communication Clinic at the [facility]. It is unlikely they will meet their goal of a more androgynous voice without skilled intervention services.     Speech therapy services are deemed medically necessary at this time. The initial evaluation documented a significant need for skilled intervention. It can be expected that measurable progress will be documented in a reasonable amount of time. Services will be provided safely and effectively in an appropriate environment by a licensed speech-language pathologist. It is not anticipated that voice and communication will be self-corrected without skilled intervention.     Recommendations:  It is recommended that Jerel receive voice and communication gender-affirming therapy at the [facility] Transgender Voice and Communication [facility] within the [facility]'s Speech and Hearing Clinic. Jerel requested to be placed on the waitlist to receive services.        It is recommended that Jerel attend therapy once a week for 60-minute sessions for 1 to 2 years to learn basic techniques and strategies to modify their voice and communication.  Generalization of their voice and communication will depend on the ease of learning these skills and the amount of practice outside of the therapy session.       Before beginning therapy, it might be helpful to discuss the environment and professionals that will be working with Jerel throughout their therapy to reduce any anxiety about the process.     A comprehensive voice and communication program is recommended to include the following goals and objects, which should be discussed and prioritized with Jerel:      Long-Term Goal 1: Jerel will achieve an androgynous voice in a safe and efficient manner.       Short-Term Goal 1: Jerel will participate in  vocal relaxation exercises to prepare the vocal mechanism for modifications.     Short-Term Goal 2: Jerel will participate in breath support exercises to prepare the vocal mechanism for modifications.    Short-Term Goal 3: Pitch will be determined with Jerel as they explore their ideal voice.     Short-Term Goal 4: Resonance will be determined with Jerel at they explore resonance from chest, to neck, to forward oral resonance.       Short-Term Goal 5: Verbal communication features will be determined with Jerel as they explore the gender spectrum of verbal communication.      Short-Term Goal 6: Nonverbal communication features will be determined with Jerel as they explore the gender spectrum of nonverbal communication.      Short-Term Goal 7: Clinician will monitor vocal quality and vocal hygiene in terms of water intake and vocal habits.  It was recommended that Jerel increase their water intake to half of their body weight in ounces. This should also help offset the medication side effects of dry lips.    Jerel reported that they may have caused damage to their voice from attempting voice therapy from YouTube videos and Reddit posts, as well as they used to practice sword swallowing. It is recommended vocal quality be monitored and an endoscopic evaluation be performed if additional concerns arise.    F. Based on the [facility] checklist, Jerel expressed interest in an Endocrinologist and a Primary Care Physician. The following are recommended below, but not limited to:     Amberly Lopez MD   [address] [address]  Pronouns: She/her/hers  Phone: [phone]  [email or web address]     Based on the [facility] checklist, Jerel expressed interest in a Mental Health Care Provider. The following is recommended below:    Lilliam Last  [facility]   [phone]    [place] [address]  [address] [place] [address]   [phone]      Jeerl expressed some interest in attending group therapy. The following resource was provided to Jerel:    Courtney Saucedo    [facility]  [address] [place]   [place] [address]  Phone: [phone]         Speech-Language Pathologist/Supervisor: Abbie Olszewski, Ph.D. CCC-SLP            Clinician:  DENZEL Shipman   Clinician:  Shea Frankenberger, B.S.     Clinician: DENZEL eHrrera                                         Speech Therapy Plan :   Prognosis:  Good  Goals  Short Term Goals:    Short-Term Goal 1: Jerel will participate in vocal relaxation exercises to prepare the vocal mechanism for modifications.     Short-Term Goal 2: Jerel will participate in breath support exercises to prepare the vocal mechanism for modifications.    Short-Term Goal 3: Pitch will be determined with Jerel as they explore their ideal voice.     Short-Term Goal 4: Resonance will be determined with Jerel at they explore resonance from chest, to neck, to forward oral resonance.       Short-Term Goal 5: Verbal communication features will be determined with Jerel as they explore the gender spectrum of verbal communication.      Short-Term Goal 6: Nonverbal communication features will be determined with Jerel as they explore the gender spectrum of nonverbal communication.      Short-Term Goal 7: Clinician will monitor vocal quality and vocal hygiene in terms of water intake and vocal habits.  It was recommended that Jerel increase their water intake to half of their body weight in ounces. This should also help offset the medication side effects of dry lips.  Short Term Goal Duration (Weeks):  2-4 months  Long Term Goals:    Long-Term Goal 1: Jerel will achieve an androgynous voice in a safe and efficient manner.     Long Term Goal Duration (Weeks):  4-6 months  Patient Stated Goal:  To achieve an androgynous and more femme  voice.  Potential barriers to Goal Achievement:  None  Therapy Recommendations  Recommendation:  Individual Speech Therapy, CPT 92507 x 1  Planned Therapy Interventions:  Speech/Language training,   Plan Details:  The [facility] Clinic operated by [facility] is primarily a teaching and research center serving the needs of the [facility] and surrounding communities, while providing direct clinical experiences to graduate learners in the department of Speech Pathology and Audiology.  As such, consumers of services should anticipate breaks in service between university semesters; possible protracted waitlist placements; and placement on the active therapy roster dependent on supervisor and student availability, and at the discretion of the Clinical Director.  Frequency:  1x week  Duration (in weeks):  20    Functional Assessment Used  [facility]    Referring provider co-signature:  I have reviewed this plan of care and my co-signature certifies the need for services.    Certification Period: 10/05/2022 to  10/05/2023    Physician Signature: ________________________________ Date: ______________

## 2022-11-14 DIAGNOSIS — F33.1 MODERATE EPISODE OF RECURRENT MAJOR DEPRESSIVE DISORDER (HCC): ICD-10-CM

## 2022-11-14 NOTE — TELEPHONE ENCOUNTER
Received request via: Patient    Was the patient seen in the last year in this department? Yes    Does the patient have an active prescription (recently filled or refills available) for medication(s) requested? No    Does the patient have care home Plus and need 100 day supply (blood pressure, diabetes and cholesterol meds only)? Medication is not for cholesterol, blood pressure or diabetes

## 2022-11-15 RX ORDER — BUPROPION HYDROCHLORIDE 300 MG/1
300 TABLET ORAL EVERY MORNING
Qty: 90 TABLET | Refills: 0 | Status: SHIPPED
Start: 2022-11-15 | End: 2022-11-29

## 2022-11-29 ENCOUNTER — OFFICE VISIT (OUTPATIENT)
Dept: BEHAVIORAL HEALTH | Facility: CLINIC | Age: 37
End: 2022-11-29
Payer: COMMERCIAL

## 2022-11-29 DIAGNOSIS — F33.1 MODERATE EPISODE OF RECURRENT MAJOR DEPRESSIVE DISORDER (HCC): ICD-10-CM

## 2022-11-29 DIAGNOSIS — F90.0 ATTENTION DEFICIT HYPERACTIVITY DISORDER (ADHD), PREDOMINANTLY INATTENTIVE TYPE: ICD-10-CM

## 2022-11-29 PROCEDURE — 99214 OFFICE O/P EST MOD 30 MIN: CPT | Performed by: PSYCHIATRY & NEUROLOGY

## 2022-11-29 RX ORDER — GABAPENTIN 100 MG/1
100 CAPSULE ORAL 3 TIMES DAILY
Qty: 90 CAPSULE | Refills: 2 | Status: SHIPPED | OUTPATIENT
Start: 2022-11-29 | End: 2023-01-31 | Stop reason: SDUPTHER

## 2022-11-29 RX ORDER — BUPROPION HYDROCHLORIDE 150 MG/1
150 TABLET ORAL EVERY MORNING
Qty: 7 TABLET | Refills: 0 | Status: SHIPPED | OUTPATIENT
Start: 2022-11-29 | End: 2022-12-06

## 2022-11-29 RX ORDER — DEXTROAMPHETAMINE SACCHARATE, AMPHETAMINE ASPARTATE MONOHYDRATE, DEXTROAMPHETAMINE SULFATE AND AMPHETAMINE SULFATE 2.5; 2.5; 2.5; 2.5 MG/1; MG/1; MG/1; MG/1
10 CAPSULE, EXTENDED RELEASE ORAL EVERY MORNING
Qty: 30 CAPSULE | Refills: 0 | Status: SHIPPED
Start: 2022-11-29 | End: 2022-11-30

## 2022-11-29 ASSESSMENT — ENCOUNTER SYMPTOMS
INCREASED ENERGY: 0
INCREASED GOAL-DIRECTED ACTIVITY: 0
DIFFICULTY FALLING ASLEEP: 0
DECREASED APPETITE: 0
DELUSIONS: 0
AWAKENING FROM SLEEP: 0
PREMATURE MORNING AWAKENING: 0
COMPULSIONS: 0
PREOCCUPATION: 0
THOUGHT CONTENT - SHAME: 0
PARANOIA: 0
PANIC: 0
HYPERSOMNIA: 0
DECREASED NEED FOR SLEEP: 0
HOPELESSNESS: 0
DECREASED ENERGY: 0

## 2022-11-29 ASSESSMENT — SOCIAL DETERMINANTS OF HEALTH (SDOH): ANXIETY ASSOCIATED WITH SOCIAL SUPPORT SYSTEM: 1
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
## 2022-11-30 ENCOUNTER — TELEPHONE (OUTPATIENT)
Dept: BEHAVIORAL HEALTH | Facility: PSYCHIATRIC FACILITY | Age: 37
End: 2022-11-30
Payer: COMMERCIAL

## 2022-11-30 ENCOUNTER — DOCUMENTATION (OUTPATIENT)
Dept: BEHAVIORAL HEALTH | Facility: CLINIC | Age: 37
End: 2022-11-30
Payer: COMMERCIAL

## 2022-11-30 DIAGNOSIS — F90.0 ATTENTION DEFICIT HYPERACTIVITY DISORDER (ADHD), PREDOMINANTLY INATTENTIVE TYPE: ICD-10-CM

## 2022-11-30 RX ORDER — DEXTROAMPHETAMINE SULFATE 5 MG/1
5 CAPSULE, EXTENDED RELEASE ORAL DAILY
Qty: 30 CAPSULE | Refills: 0 | Status: SHIPPED
Start: 2022-11-30 | End: 2022-12-06

## 2022-12-06 DIAGNOSIS — F90.0 ATTENTION DEFICIT HYPERACTIVITY DISORDER (ADHD), PREDOMINANTLY INATTENTIVE TYPE: ICD-10-CM

## 2022-12-06 RX ORDER — DEXTROAMPHETAMINE SULFATE 5 MG/1
5 CAPSULE, EXTENDED RELEASE ORAL DAILY
Qty: 30 CAPSULE | Refills: 0 | Status: SHIPPED | OUTPATIENT
Start: 2022-12-06 | End: 2023-01-03

## 2022-12-27 ENCOUNTER — DOCUMENTATION (OUTPATIENT)
Dept: BEHAVIORAL HEALTH | Facility: CLINIC | Age: 37
End: 2022-12-27
Payer: COMMERCIAL

## 2022-12-30 ENCOUNTER — APPOINTMENT (OUTPATIENT)
Dept: BEHAVIORAL HEALTH | Facility: CLINIC | Age: 37
End: 2022-12-30
Payer: COMMERCIAL

## 2023-01-03 DIAGNOSIS — F90.0 ATTENTION DEFICIT HYPERACTIVITY DISORDER (ADHD), PREDOMINANTLY INATTENTIVE TYPE: ICD-10-CM

## 2023-01-03 RX ORDER — DEXTROAMPHETAMINE SULFATE 10 MG/1
10 CAPSULE, EXTENDED RELEASE ORAL DAILY
Qty: 30 CAPSULE | Refills: 0 | Status: SHIPPED | OUTPATIENT
Start: 2023-01-03 | End: 2023-01-31 | Stop reason: SDUPTHER

## 2023-01-18 ENCOUNTER — DOCUMENTATION (OUTPATIENT)
Dept: BEHAVIORAL HEALTH | Facility: CLINIC | Age: 38
End: 2023-01-18
Payer: COMMERCIAL

## 2023-01-31 ENCOUNTER — OFFICE VISIT (OUTPATIENT)
Dept: BEHAVIORAL HEALTH | Facility: CLINIC | Age: 38
End: 2023-01-31
Payer: COMMERCIAL

## 2023-01-31 ENCOUNTER — SPEECH THERAPY (OUTPATIENT)
Dept: SPEECH THERAPY | Facility: OTHER | Age: 38
End: 2023-01-31
Attending: STUDENT IN AN ORGANIZED HEALTH CARE EDUCATION/TRAINING PROGRAM
Payer: COMMERCIAL

## 2023-01-31 DIAGNOSIS — R49.8 OTHER VOICE AND RESONANCE DISORDERS: ICD-10-CM

## 2023-01-31 DIAGNOSIS — F90.0 ATTENTION DEFICIT HYPERACTIVITY DISORDER (ADHD), PREDOMINANTLY INATTENTIVE TYPE: ICD-10-CM

## 2023-01-31 PROCEDURE — 99214 OFFICE O/P EST MOD 30 MIN: CPT | Performed by: PSYCHIATRY & NEUROLOGY

## 2023-01-31 PROCEDURE — 92507 TX SP LANG VOICE COMM INDIV: CPT | Mod: GN,GC | Performed by: SPEECH-LANGUAGE PATHOLOGIST

## 2023-01-31 RX ORDER — DEXTROAMPHETAMINE SULFATE 10 MG/1
10 CAPSULE, EXTENDED RELEASE ORAL DAILY
Qty: 30 CAPSULE | Refills: 0 | Status: SHIPPED | OUTPATIENT
Start: 2023-03-02 | End: 2023-03-07 | Stop reason: SDUPTHER

## 2023-01-31 RX ORDER — GABAPENTIN 100 MG/1
100 CAPSULE ORAL 3 TIMES DAILY
Qty: 270 CAPSULE | Refills: 1 | Status: SHIPPED
Start: 2023-01-31 | End: 2023-03-07

## 2023-01-31 RX ORDER — DEXTROAMPHETAMINE SULFATE 10 MG/1
10 CAPSULE, EXTENDED RELEASE ORAL DAILY
Qty: 30 CAPSULE | Refills: 0 | Status: SHIPPED | OUTPATIENT
Start: 2023-01-31 | End: 2023-03-02

## 2023-01-31 RX ORDER — DEXTROAMPHETAMINE SULFATE 10 MG/1
10 CAPSULE, EXTENDED RELEASE ORAL DAILY
Qty: 30 CAPSULE | Refills: 0 | Status: SHIPPED
Start: 2023-04-01 | End: 2023-03-14

## 2023-01-31 ASSESSMENT — ENCOUNTER SYMPTOMS
HOPELESSNESS: 0
DELUSIONS: 0
DECREASED ENERGY: 0
THOUGHT CONTENT - SHAME: 0
INCREASED ENERGY: 0
DECREASED APPETITE: 0
PARANOIA: 0
AWAKENING FROM SLEEP: 0
DIFFICULTY FALLING ASLEEP: 0
HYPERSOMNIA: 0
COMPULSIONS: 0
DECREASED NEED FOR SLEEP: 0
INCREASED GOAL-DIRECTED ACTIVITY: 0
PREMATURE MORNING AWAKENING: 0
PANIC: 0
PREOCCUPATION: 0

## 2023-01-31 ASSESSMENT — SOCIAL DETERMINANTS OF HEALTH (SDOH): ANXIETY ASSOCIATED WITH SOCIAL SUPPORT SYSTEM: 0
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
## 2023-02-01 NOTE — OP THERAPY DAILY TREATMENT
Outpatient Speech Therapy  DAILY TREATMENT     [facility]UDIOLOG[facility]  [address] [place] [id number]  Phone:  [phone]  Fax:  [phone]    Date: 2023    Patient: Jerel Lopez  YOB: 1985  MRN: [id number]     Time Calculation      1500 1600 1hour         Chief Complaint: Speech Therapy    Visit #:     Subjective Evaluation    Jerel, who legally changed their name to Janneth, arrived on time and independently to the session. They had a positive demeanor and engaged in friendly conversation with the clinician. They were curious about the therapy process and asked lots of questions. They were excited to begin therapy and learn about the diffeernt ways to use their voice.     This visit was supervised by a licensed and certified speech-language pathologist, who was available for 100% of the session.     Speech Therapy Objective   Pre-Session 1 of the VC program was implemented during today's session in order to determine Janneth's level of commitment to therapy and to increase their awareness of the diverse components of vocal and physical presentation of self.      Short Term Goal 1: Vocal Relaxation Exercises  See data under Short Term Goal 3.     Short Term Goal 2: Breath Support Exercises  See data under Short Term Goal 3.     Short Term Goal 3: Increase Speaking Fundamental Frequency   Establish Level of Commitment   Janneth rated their level of commitment to the program process out of 100% using SM1 Visual Analog Scale (VAS) for Level of Commitment as follows:     1. How committed are you to practicing at home: 100%  2. How committed are you to making up missed sessions: 100%  3. How committed are you to exploring your full vocal range: 100%  4. How committed are you to letting go of your current voice: 100%  5. How committed are you to fully transitionin%  6. How important is it for you to transition: 100%  7. How motivated are you to  "engage in personal practice beyond guided sessions: 100%  8. How confident are you that you can make this transition: 100%  9. How supported by friends and family do you feel in making this transition: 50%  10. How confident are you about overcoming difficult tasks during your trainin%    Describe the factors that prevent you from being more committed: Janneth stated that there was not anything they could think of at the moment.      Describe the factors that motivate you to be more committed: Janneth is committed to experience a more true-self with voice. They do not want to be put into a category with their voice, such as being \"clocked as male.\" They want to sound for ambiguous or feminine. They are very curious to explore their capabilities and want to gain greater control of their voice. They are motivated to learn about their vocal tract and the different voice components.     Determine Character Profile  Janneth determined their character presentation profile using [facility]: Character Profile Worksheet as follows:     1. Basic Information  A. Name: Tali  B. Gender Identity: genderqueer   C. Age: 37  D. Sexual Orientation: pansexual  E. Educational Level: Currently in college  F. Economic/Social Status: Low class     2. Cultural Context  A. Mosque: Athiest/Athiestic Satanist  B. Class: Low class  C. Politics: Leftist/Anarchist     3. Physical Characteristics  A. Height: 5'9\"  B. Weight: 170ish pounds  C. Hair Color: Blonde  D. Eye Color: Blue  E. Body Type: Just there, got a bod    Awareness of Voice Components  Janneth  increased their awareness of the following vocal components to determine their maneuverability along the spectrum of vocal use as follows:      1. Tempo/Rate  A. Fast - Janneth increased their rate of speech when talking about a phone call they had with their water supplier. The clinician and Janneth talked about how tempo speeds up when speaking about something exciting. Discussed how they mostly " use a fast tempo when they speak.   B. Slow - Vivid easily decreased their rate of speech when speaking about sports, which is something that bores them.      2. Rhythm/Prosody  A. Melodic - Vivid demonstrated a melodic voice when pretending to talk to a cat saying “Here Lillian.”   B. Monotone - Vivid easily demonstrated a monotonous voice when using the provided vocal prompt.     3. Articulation  A. Precise - Vivid and the clinician discussed speaking in a precise manner while pretending to give a speech for work. Vivid and the clinicain discussed the difference between annunciation and articulation.   B. Imprecise - Vivid easily spoke in a manner with imprecise articulation while pretending to be in a confused state given the provided vocal prompt.      4. Pronunciation  A. Regionalism - Vivid said their hometown is in [place] and that they didn’t think they had a good example of that. We discussed east coast versus west coast accent.    B. Generality - Vivid discussed the many accents in [place] while they lived there.      5. Pitch  A. High - Vivid easily spoke in a high pitch when pretending to call a cat.   B. Low - Vivid spoke in a low pitch when using the provided vocal prompt. They felt like their voice was already low.      6. Volume/Intensity  A. Loud - Vivid easily projected their voice and spoke at a loud volume. They stated that their voice is naturally loud.   B. Soft - Vivid spoke in a soft whisper.      7. Quality    A. Resonance (nasal) - Vivid easily projected a nasal resonance. We discussed the TV character Randell Jordan as an example.   B. Resonance (chest) - Vivid easily projected a chest resonance. They stated they utilze their chest resonance often when speaking.  C. Breathiness (hard contact) - Vivid attempted to  produced hard contact breathiness.   D. Breathiness (soft contact) - Vivid had produced a soft-contact breathiness and stated it felt similar to a whisper.      8. Word Choice  A.  Descriptive - Vivid easily spoke with descriptive words when asked to describe the weather outside. They talked about how snow excites them and we noted that their tempo also sped up.   B. Concise - Vivid easily spoke concisely.     9. Nonverbal Communication  A. Gesture (fluid) - Vivid easily used fluid gestures while pretending to describe an exciting event.   B. Gesture (rigid) - Vivid and the clinician discussed rigid gestures while pretending they were lecturing someone. The clinician modeled an example of this.   C. Facial Expression (flat) -  Vivid and the clinician discussed what a flat affect looks like such as showing no emotion on the face.   D. Facial Expression (animated) - Vivid and the clincian made animated facial expressions by raising their eyebrows and widening their eyes.    Vocal Analysis  Vivid measured their awareness of the components that make up the voice using SM3: Visual Analog Scale (VAS) for Habitual Voice as follows:     1. What is your average rate of speech: fast  2. How would you describe your rhythm/prosody: more melodic  3. How would you describe your average speaking pitch: neutral, in the middle of low and high  4. How would you describe your average volume/intensity: more loud  5. How would you describe your resonance: mostly chest  6. How would you describe your breathiness: not answered  7. How would you describe your articulation: neutral  8. How would you describe your vocabulary: more descriptive  9. How would you describe your nonverbal gestures: fluid  10. How would you describe your nonverbal facial expressions: more animated     Describe how aware you were of these components of your voice prior to this session:  Not answered     Short Term Goal 4: Oral-Forward Resonance  See data under Short Term Goal 3.     Short Term Goal 5: Feminine Verbal Aspects of Communication   See data under Short Term Goal 3.     Short Term Goal 6: Feminine Nonverbal Aspects of Communication  "  See data under Short Term Goal 3.     Short Term Goal 7: Vocal Quality and Vocal Hygiene   See data under Short Term Goal 3.      Assessments    Short-Term Goal 3: Establish Level of Commitment   Based on Janneth’s responses to the Level of Commitment Visual Analog Scale Worksheet, their level of commitment appears relatively high. The average percentage of their 10 responses on the worksheet is 95%.      Determine Character Profile  Janneth described their character as a 37 year old genderqueer individual that is in college. They said that their character is lower class, atheist, and a leftist/anarchist.      Awareness of Voice Components  Janneth demonstrated flexibility in both verbal and nonverbal aspects of communication. They actively participated in experimenting with the various vocal components and adjusted the provided prompts as necessary. Janneth appeared comfortable practicing the different vocal components and modulating their voice in front of the clinician.      Homework  Janneth created 10 everyday sentences to practice with different parts of the \"vocal recipe\" for homework. They were encouraged to record themselves practicing using the various voice components. Vocal hygiene was discussed with Janneth during today's session. They were asked to document and increase their daily water intake to half of their body weight in ounces for homework.      Speech Therapy Plan :   Goals  Short Term Goals:    The following goals are part of Janneth's tentative treatment plan for spring 2023 treatment period.    Short-Term Goal 1: Janneth will participate in vocal relaxation exercises to prepare the vocal mechanism for modifications with minimal clinical support by the end of the treatment period.      Short-Term Goal 2: Janneth  will participate in breath support exercises to prepare the vocal mechanism for modifications with minimal clinician support by the end of the treatment period.      Tentative Short-Term Goal 3: " "Janneth will increase their average speaking fundamental frequency to 131 Hz at the word level in 4/5 opportunities with minimal clinician support by the end of the treatment period.      Tentative Short-Term Goal 4: Janneth will produce an oral-forward resonance at the word level in 4/5 opportunities with minimal clinician support by the end of the treatment period.      Short-Term Goal 5: Janneth will use more feminine verbal aspects of communication during structured tasks (i.e., rising intonation) in 4/5 opportunities with minimal clinician support by the end of the treatment period.      Short-Term Goal 6: Janneth will use more feminine nonverbal aspects of communication during structured tasks (i.e., gestures, head nods, gait) in 4/5 opportunities with minimal clinician support by the end of the treatment period.      Short-Term Goal 7: The assigned clinician will monitor Janneth's vocal quality and vocal hygiene in terms of water intake and vocal habits.   Short Term Goal Duration (Weeks):  2-4 months    Long Term Goals:    Long-Term Goal 1: Janneth will achieve a more feminine, gender-congruent voice in a safe and efficient manner.     Prognosis:  Good  Goals      Long Term Goal Duration (Weeks):  4-6 months     Plan for Next Session:  - Review the assigned homework with Janneth.   - Implement the next phase, Pre-Session 2, of the VC program.  - Review the terms for \"character\" and \"self\"                "

## 2023-02-07 ENCOUNTER — SPEECH THERAPY (OUTPATIENT)
Dept: SPEECH THERAPY | Facility: OTHER | Age: 38
End: 2023-02-07
Payer: COMMERCIAL

## 2023-02-07 DIAGNOSIS — R49.8 OTHER VOICE AND RESONANCE DISORDERS: ICD-10-CM

## 2023-02-07 PROCEDURE — 92507 TX SP LANG VOICE COMM INDIV: CPT | Mod: GN,GC | Performed by: SPEECH-LANGUAGE PATHOLOGIST

## 2023-02-08 NOTE — OP THERAPY DAILY TREATMENT
Outpatient Speech Therapy  DAILY TREATMENT     [facility] SPEECH PATHOLOGY AND AUDIOLOGY  [address] [place] [id number]  Phone:  [phone]  Fax:  [phone]    Date: 02/07/2023    Patient: Jerel Lopez  YOB: 1985  MRN: [id number]     Time Calculation      3:00pm 4:00pm 1 hour         Chief Complaint: Speech Therapy   Visit #: 2/90    Subjective Evaluation  Janneth arrived on time and independently to the session. They engaged in conversation with the clinician about their day, school, and work. They appeared to be in a happy and calm mood. Janneth had a positive demeanor and asked lots of questions during the session.      This visit was supervised by a licensed and certified speech-language pathologist, who was available for 100% of the session.    Speech Therapy Objective   Pre-Session 2 of the VC Program was implemented during today's session in order to determine Janneth's ideal voice, evaluate their informed commitment to the therapy, and prepare them for future sessions.     Short Term Goal 1: Vocal Relaxation Exercises  Review Assigned Homework   Janneth practiced 10 functional phrases each day using the different vocal components since the previous session. They documented the vocal components they they experienced the most difficulty with and requested to review them during today's session.      Short Term Goal 2: Breath Support Exercises  The following goal was not addressed today due to priority of Short Term Goal 3 in the Pre-Session Part 2 section of the VC Program.     Short Term Goal 3: Increase Speaking Fundamental Frequency   Establish Informed Level of Commitment   Janneth rated their level of commitment to the program process out of 100% using the SM6: Visual Analog Scale (VAS) for Informed Level of Commitment as follows:      1. How committed are you to practicing at home: 100%  2. How committed are you to making up missed sessions: 100% ( they stated they  "do not want to miss any sessions)   3. How committed are you to exploring your full vocal range: 100%  4. How committed are you to letting go of your current voice: 100%  5. How committed are you to fully transitionin%  6. How important is it for you to transition: 100%  7. How motivated are you to engage in personal practice beyond guided sessions: 100%   8. How confident are you that you can make this transition: 100%  9. How supported by friends and family do you feel in making this transition: 0-50%  10. How confident are you about overcoming difficult tasks during your trainin%     Describe the factors preventing you from being less committed: Determination to change outward characteristics to more truly represent internal identity.      Describe the factors preventing you from being more committed: Community support.      Determine Ideal Identity Profile   Janneth determined their ideal identity using the SM4: Ideal Identity Profile document as follows:     1. Basic Information  A. Name: Janneth/Becki  B. Gender Identity: Genderqueer- femme  C. Age: 37  D. Sexual Orientation: Pansexual  E. Educational Level:Grad school  F. Economic/Social Status: comfortable but not too comfortable/ orderTalk     2. Cultural Context  A. Hoahaoism: Atheistic Satanist  B. Class: [employer]  C. Politics: Leftist     3. Physical Characteristics  A. Height: 5'7\"  B. Weight: 140-170  C. Hair Color: Colorful  D. Eye Color: Blue  E. Body Type: Average      Vocal Awareness Review  Janneth reviewed the components of vocal awareness and discussed which components they experienced the most ease and difficulty using. The data are as follows:      1. Tempo/Rate  A. Fast - They easily increased their rate of speech.   B. Slow - They easily decreased their rate of speech.      2. Rhythm/Prosody  A. Melodic -  They easily demonstrated flexible prosody.  B. Monotone - They easily spoke in a monotone voice.     3. Articulation  A. " Precise - They easily spoke in a precise manner.   B. Imprecise - They easily spoke in an imprecise manner.     4. Pronunciation  A. Regionalism - They requested clarification for this element. Clinician modeled an example and client followed it.   B. Generality - They struggled to find an accent to use for this element. Clinician modeled an example and the client followed it.      5. Pitch  A. High - They easily spoke with a high pitch.   B. Low - They easily spoke with a low pitch.      6. Volume/Intensity  A. Loud - They easily made their voice louder.  B. Soft - They easily made their voice softer.     7. Quality  A. Resonance (nasal) - They easily produced a nasal resonance.  B. Resonance (chest) - They easily produced a chest resonance.   C. Breathiness (hard contact) - They easily spoke with hard contact resonance while speaking following clinician modeling.  D. Breathiness (soft contact) - They easily spoke with soft contact breathiness while speaking.      8. Word Choice  A. Descriptive - They easily spoke using descriptive words.   B. Concise - They easily spoke concisely.      9. Nonverbal Communication  A. Gesture (fluid) - They demonstrated ease moving fluidly and emphasized moving their hands as they talked.   B. Gesture (rigid) - They demonstrated ease moving rigidly and emphasized sitting up straight and still.   C. Facial Expression (animated) - They easily made an animated facial expression by widening their eyes and raising and eyebrows.   D. Facial Expression (flat) - They easily made a flat affect by staring straight ahead at the clinician.     Determine Ideal Voice  Vivid determined their ideal voice by using the SM5: Visual Analog for Ideal Voice document as follows:     1. What is your average rate of speech: mostly fast  2. How would you describe your rhythm/prosody: mostly melodic  3. How would you describe your average speaking pitch: mostly high  4. How would you describe your average  volume/intensity: neutral; slightly softer  5. How would you describe your resonance: neutral; slightly more nasal  6. How would you describe your breathiness: neutral  7. How would you describe your articulation: neutral  8. How would you describe your vocabulary: mostly descriptive  9. How would you describe your nonverbal gestures: mostly fluid  10. How would you describe your nonverbal facial expressions: mostly animated     Describe your willingness to adjust your habitual vocal use in order to achieve your ideal voice: They want to disrupt these habits and are super willing to achieve their ideal voice.      Describe any challenges you may have adjusting your habitual vocal use: Janneth stated it's going to be difficult all around.      Short Term Goal 4: Oral-Forward Resonance  See data under Short Term Goal 3.      Short Term Goal 5: Feminine Verbal Aspects of Communication   See data under Short Term Goal 3.     Short Term Goal 6: Feminine Nonverbal Aspects of Communication   See data under Short Term Goal 3.     Short Term Goal 7: Vocal Quality and Vocal Hygiene   See data under Short Term Goal 3.    Assessments    Short Term Goal 1  Review Assigned Homework   Janneth completed their homework from last week using each vocal component to practice maneuvering their voice. They had questions about the following vocal elements: regionalism, generality, precise and and imprecise pronunciation, hard contact and soft contact breathiness. The clinician modeled an example to recall and Janneth practiced it again. The clinician discussed water intake and if the client noticed any strain in their voice while practicing their homework. Janneth did not notice any voice issues while doing their homework.        Short Term Goal 3  Establish Level of Informed Commitment  Based on Janneth's responses to the SM6: Visual Analog Scale (VAS) for Informed Level of Commitment document, their level of commitment is very high.       "  Determine Ideal Identity Profile   Janneth described their ideal self as a 37 year old pansexual, genderqueer femme, that will complete graduate school, and have a economic status of \"comfortable but not too comfortable.\" Their ideal self's culture is described as an  \"artsy weirdo\" who is a leftist and atheist/Satanist. Their ideal physical self is 5'9\", weights 140-170 pounds, with blue eyes, colorful hair, and an average body type. Additionally, Janneth expressed their desire to not be \"locked in\" to any particular \"ideal voice\" and wanted to be open to change it.    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     Awareness of Voice Components  Janneth demonstrated the ability to reach both ends of the spectrum on most vocal components.       Janneth compared their Ideal Voice Visual Analog Scale from today's session to their Habitual Voice Visual Analog scale from the prior session. They noted that their scores were the same for both sessions.     Speech Therapy Plan  Plan for Next Session:  -Review homework with Beckiid.  -Implement Session 1 of the VC program.              "

## 2023-02-14 ENCOUNTER — SPEECH THERAPY (OUTPATIENT)
Dept: SPEECH THERAPY | Facility: OTHER | Age: 38
End: 2023-02-14
Payer: COMMERCIAL

## 2023-02-14 DIAGNOSIS — R49.8 OTHER VOICE AND RESONANCE DISORDERS: ICD-10-CM

## 2023-02-14 PROCEDURE — 92507 TX SP LANG VOICE COMM INDIV: CPT | Mod: GN,GC | Performed by: SPEECH-LANGUAGE PATHOLOGIST

## 2023-02-15 NOTE — OP THERAPY DAILY TREATMENT
Outpatient Speech Therapy  DAILY TREATMENT     [facility] SPEECH PATHOLOGY AND AUDIOLOGY  [address] [place] [id number]  Phone:  [phone]  Fax:  [phone]    Date: 02/14/2023    Patient: Janneth Lopez  YOB: 1985  MRN: [id number]     Time Calculation    Start time: 1500  Stop time: 1600 Time Calculation (min): 60 minutes         Chief Complaint: Speech Therapy    Visit #: 3/90     Subjective Evaluation    Janneth arrived on-time and independently to the session. They were doing schoolwork in the waiting room before their session began. They reported they were feeling good but had lower energy than usual today due to withdrawing from a medication. Overall, they were positive and engaged during the session.     Speech Therapy Objective   Session 1 of the VC Program was implemented during today's session in order to teach Janneth proper warm-up techniques for vocal exercises and introduce vocal aspects, including a target pitch of 180 Hz, verbal communication, and nonverbal communication.        Review Assigned Homework:  Janneth practiced 10 new phrases each day using the different vocal components since the previous session. They reported that they practiced a [place] accent for the regionalism vocal element and had improved practicing nasal voice from the last session. They didn't enjoy practicing the elements when they seemed unnatural or like an archetype. They practiced the vocal elements with new phrases while they drove because the 10 everyday phrases started to feel repetitive.     Short Term Goal 1: Vocal Relaxation Exercises  Today's session focused on reducing any jaw, tongue, larynx, and shoulder tension in preparation for vocal exercises. Janneth was introduced to the following vocal relaxation techniques: Laryngeal Massage and Yawn-Sigh. The purpose of each exercise was discussed with Janneth, and they were provided with a demonstration of each before practicing  together and then again independently.      Short Term Goal 2: Breath Support Exercises  Today's session focused on increasing Janneth's awareness of breath support techniques in order to coordinate breathing and phonating as well as increase their vital lung capacity. Janneth was introduced to the following vocal relaxation techniques: Yoga Breathing and Snuff-Hiss. The purpose of each exercise was discussed with Janneth and they were provided with a demonstration of each before practicing together and then again independently.      Short Term Goal 3: Increase Speaking Fundamental Frequency   Today's session focused on safely increasing Janneth's target pitch and semitone range while phonating through a linguistic hierarchy. Janneth's pitch was calculated to be F3# (180 Hz), and their 5-note scale was D3, E3, F3#, G3, and A3. First, they hummed to match their target pitch (F3#, 131 Hz), then hummed to match their lower target pitch (D3). They then hummed notes in an ascending scale (D3, E3, F3#), and then again in an ascending-descending scale (D3, E3, F3#, G3, A3.). Lastly, they hummed all five notes in an an ascending-descending scale (A3, G3, F3#, E3, D3 to D3, E3, F3#, G3, A3).    Short Term Goal 4: Oral-Forward Resonance  Today's session focused on moving resonance of sound from the chest to the head and facial area by increasing awareness and the feeling of forward oral resonance. Janneth was introduced to warm-up techniques including neck and shoulder stretching as well as lip-trills with and without voicing. They practiced humming /m/ at their target pitch (F3#, 180 Hz). Janneth then practiced chanting /m/, /i/, and /mi: mi/ at their target pitch (F3#, 180 Hz) to achieve an oral-forward resonance. They were provided with a demonstration for each exercise, practice with support, and the opportunity to practice independently.     Short-Term 5: Feminine Verbal Aspects of Communication     This goal was not met due to time  focused on the other goals.    Short Term Goal 6: Feminine Nonverbal Aspects of Communication     This goal was not met due to time focused on the other goals.    Short Term Goal 7: Vocal Quality and Vocal Hygiene   Vocal hygiene was discussed with Janneth using SM7: Vocal Hygiene Handout. Janneth documented their daily water intake on their homework for a calculated average of 120 ounces daily for the previous week.     Assessments    Short-Term Goal 1: Vocal Relaxation Exercises  Janneth demonstrated understanding of the vocal relaxation techniques through a vocal response and completing exercises with minimal assistance of clinician modeling. They reported the vocal relaxation exercises to be an easy task for them. 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 Janneth expressed curiosity about where their hyoid bone was and the clinician showed them a picture to help clarify this.      Short Term Goal 2: Breath Support Exercises  Janneth demonstrated understanding of the breath support techniques through a vocal response and completing exercises with minimal assistance of clinician modeling. They were instructed to release all tension from their shoulders during the breath support exercises. This exercise was done against a wall for biofeedback. They reported the breath support exercises to be an easy task for them.    Short Term Goal 3: Increase Speaking Fundamental Frequency   During the pitch exercises, Janneth easily hit their target pitch with minimal clinician assistance. The clinician used a piano vanessa to cue Janneth to their target pitch of 180 Hz when necessary. They were able to easily hum their lower pitch of D3. They easily hummed notes in an ascending scale (D3, E3, F3#), and then again in an ascending-descending scale (D3, E3, F3#, G3, A3.).      Short Term Goal 4: Oral-Forward Resonance  During oral-forward resonance exercises, Janneth actively participated  "in trying to achieve their target pitch (F3#) as closely as possible. They hit their target with ease while they participated in humming and chanting exercise.They benefited from monitoring their average fundamental frequency using the piano vanessa and [employer].       Short Term Goal 7: Vocal Quality and Vocal Hygiene   Madis daily water intake is within the average range of approximately 120 ounces daily. They were encouraged to maintain this daily intake amount and document it on their new assigned homework sheet.     Speech Therapy Plan  -Review homework from Session 1 of the VC   -Vocal Relaxation:  review vocal relaxation exercises  -Breath Support: review breath support exercises  -Continue using clinician models while introducing new tasks.   -Pitch: Session 2 Begin matching new target pitch of 190 Hz and practicing humming the corresponding 5-note scale of E3, F3 ,G3, G3#, A3 in ascending and descending orders with a focus on an oral forward resonance. Use visual cueing (e.g., moving hands up for 'ascending' and down for 'descending') during pitch matching tasks.  -Resonance:  Session 2: humming \"amanda\"  -Breathiness: Session 2 breathiness in sentences  -Verbal Communication: Session 1 speaking rate, Session 2 rising intonation. Stress intonation during verbal communication tasks and increase Janneth's awareness of their intonation usage.   -Nonverbal Communication: Session 1 head and trunk movement, Session 2 hand and arm movement.  Target nonverbal communication using fluid movements and introduce eye contact.   - Assign homework to encourage Janneth to practice the techniques and strategies learned in therapy to generalize outside of the therapeutic context.         [x] As the licensed therapist supervising this student, I was present during the entire treatment session directing the care and reviewing the assessment plan.  I reviewed all documentation prior to signing.               "

## 2023-02-16 NOTE — OP THERAPY DAILY TREATMENT
2  Outpatient Speech Therapy  DAILY TREATMENT     [facility] SPEECH PATHOLOGY AND AUDIOLOGY  [address] [place] [id number]  Phone:  [phone]  Fax:  [phone]    Date: 02/07/2023    Patient: Janneth Lopez  YOB: 1985  MRN: [id number]     Time Calculation      1500  1600  60 minutes         Chief Complaint: Speech Therapy    Visit #: 2/90    Subjective Evaluation  Janneth arrived on time and independently to the session. They engaged in conversation with the clinician about their day, school, and work. They appeared to be in a happy and calm mood. Janneth had a positive demeanor and asked lots of questions during the session.      This visit was supervised by a licensed and certified speech-language pathologist, who was available for 100% of the session.    Speech Therapy Objective   Pre-Session 2 of the VC Program was implemented during today's session in order to determine Janneth's ideal voice, evaluate their informed commitment to the therapy, and prepare them for future sessions.    Review Assigned Homework   Janneth practiced 10 functional phrases each day using the different vocal components since the previous session. They documented the vocal components they they experienced the most difficulty with and requested to review them during today's session.     Short Term Goal 1: Vocal Relaxation Exercises  The following goal was not addressed today due to priority of Short Term Goal 3 in the Pre-Session Part 2 section of the VC Program.     Short Term Goal 2: Breath Support Exercises  The following goal was not addressed today due to priority of Short Term Goal 3 in the Pre-Session Part 2 section of the VC Program.     Short Term Goal 3: Increase Speaking Fundamental Frequency   Establish Informed Level of Commitment   Janneth rated their level of commitment to the program process out of 100% using the SM6: Visual Analog Scale (VAS) for Informed Level of Commitment as follows:  "     1. How committed are you to practicing at home: 100%  2. How committed are you to making up missed sessions: 100% ( they stated they do not want to miss any sessions)   3. How committed are you to exploring your full vocal range: 100%  4. How committed are you to letting go of your current voice: 100%  5. How committed are you to fully transitionin%  6. How important is it for you to transition: 100%  7. How motivated are you to engage in personal practice beyond guided sessions: 100%   8. How confident are you that you can make this transition: 100%  9. How supported by friends and family do you feel in making this transition: 0-50%  10. How confident are you about overcoming difficult tasks during your trainin%     Describe the factors preventing you from being less committed: Determination to change outward characteristics to more truly represent internal identity.      Describe the factors preventing you from being more committed: Community support.      Determine Ideal Identity Profile   Janneth determined their ideal identity using the SM4: Ideal Identity Profile document as follows:     1. Basic Information  A. Name: Janneth/Becki  B. Gender Identity: Genderqueer- femme  C. Age: 37  D. Sexual Orientation: Pansexual  E. Educational Level:Grad school  F. Economic/Social Status: comfortable but not too comfortable/ [employer]     2. Cultural Context  A. Buddhist: Atheistic Satanist  B. Class: [employer]  C. Politics: Leftist     3. Physical Characteristics  A. Height: 5'7\"  B. Weight: 140-170  C. Hair Color: Colorful  D. Eye Color: Blue  E. Body Type: Average      Vocal Awareness Review  Janneth reviewed the components of vocal awareness and discussed which components they experienced the most ease and difficulty using. The data are as follows:      1. Tempo/Rate  A. Fast - They easily increased their rate of speech.   B. Slow - They easily decreased their rate of speech.      2. Rhythm/Prosody  A. " Melodic -  They easily demonstrated flexible prosody.  B. Monotone - They easily spoke in a monotone voice.     3. Articulation  A. Precise - They easily spoke in a precise manner.   B. Imprecise - They easily spoke in an imprecise manner.     4. Pronunciation  A. Regionalism - They requested clarification for this element. Clinician modeled an example and client followed it.   B. Generality - They struggled to find an accent to use for this element. Clinician modeled an example and the client followed it.      5. Pitch  A. High - They easily spoke with a high pitch.   B. Low - They easily spoke with a low pitch.      6. Volume/Intensity  A. Loud - They easily made their voice louder.  B. Soft - They easily made their voice softer.     7. Quality  A. Resonance (nasal) - They easily produced a nasal resonance.  B. Resonance (chest) - They easily produced a chest resonance.   C. Breathiness (hard contact) - They easily spoke with hard contact resonance while speaking following clinician modeling.  D. Breathiness (soft contact) - They easily spoke with soft contact breathiness while speaking.      8. Word Choice  A. Descriptive - They easily spoke using descriptive words.   B. Concise - They easily spoke concisely.      9. Nonverbal Communication  A. Gesture (fluid) - They demonstrated ease moving fluidly and emphasized moving their hands as they talked.   B. Gesture (rigid) - They demonstrated ease moving rigidly and emphasized sitting up straight and still.   C. Facial Expression (animated) - They easily made an animated facial expression by widening their eyes and raising and eyebrows.   D. Facial Expression (flat) - They easily made a flat affect by staring straight ahead at the clinician.     Determine Ideal Voice  Vivid determined their ideal voice by using the SM5: Visual Analog for Ideal Voice document as follows:     1. What is your average rate of speech: mostly fast  2. How would you describe your  rhythm/prosody: mostly melodic  3. How would you describe your average speaking pitch: mostly high  4. How would you describe your average volume/intensity: neutral; slightly softer  5. How would you describe your resonance: neutral; slightly more nasal  6. How would you describe your breathiness: neutral  7. How would you describe your articulation: neutral  8. How would you describe your vocabulary: mostly descriptive  9. How would you describe your nonverbal gestures: mostly fluid  10. How would you describe your nonverbal facial expressions: mostly animated     Describe your willingness to adjust your habitual vocal use in order to achieve your ideal voice: They want to disrupt these habits and are super willing to achieve their ideal voice.      Describe any challenges you may have adjusting your habitual vocal use: Janneth stated it's going to be difficult all around.      Short Term Goal 4: Oral-Forward Resonance  See data under Short Term Goal 3.      Short Term Goal 5: Feminine Verbal Aspects of Communication   See data under Short Term Goal 3.     Short Term Goal 6: Feminine Nonverbal Aspects of Communication   See data under Short Term Goal 3.     Short Term Goal 7: Vocal Quality and Vocal Hygiene   See data under Short Term Goal 3.      Assessments    Short Term Goal 1  Review Assigned Homework   Janneth completed their homework from last week using each vocal component to practice maneuvering their voice. They had questions about the following vocal elements: regionalism, generality, precise and and imprecise pronunciation, hard contact and soft contact breathiness. The clinician modeled an example to recall and Janneth practiced it again. The clinician discussed water intake and if the client noticed any strain in their voice while practicing their homework. Janneth did not notice any voice issues while doing their homework.        Short Term Goal 3  Establish Level of Informed Commitment  Based on Janneth's  "responses to the SM6: Visual Analog Scale (VAS) for Informed Level of Commitment document, their level of commitment is very high.        Determine Ideal Identity Profile   Janneth described their ideal self as a 37 year old pansexual, genderqueer femme, that will complete graduate school, and have a economic status of \"comfortable but not too comfortable.\" Their ideal self's culture is described as an  \"artsy weirdo\" who is a leftist and atheist/Satanist. Their ideal physical self is 5'9\", weights 140-170 pounds, with blue eyes, colorful hair, and an average body type. Additionally, Janneth expressed their desire to not be \"locked in\" to any particular \"ideal voice\" and wanted to be open to change it.    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     Awareness of Voice Components  Janneth demonstrated the ability to reach both ends of the spectrum on most vocal components.       Janneth compared their Ideal Voice Visual Analog Scale from today's session to their Habitual Voice Visual Analog scale from the prior session. They noted that their scores were the same for both sessions.       Speech Therapy Plan  Plan for Next Session:  -Review homework with Janneth.  -Implement Session 1 of the VC program.      [x] As the licensed therapist supervising this student, I was present during the entire treatment session directing the care and reviewing the assessment plan.  I reviewed all documentation prior to signing.               "

## 2023-02-21 ENCOUNTER — SPEECH THERAPY (OUTPATIENT)
Dept: SPEECH THERAPY | Facility: OTHER | Age: 38
End: 2023-02-21
Payer: COMMERCIAL

## 2023-02-21 DIAGNOSIS — R49.8 OTHER VOICE AND RESONANCE DISORDERS: ICD-10-CM

## 2023-02-21 PROCEDURE — 92507 TX SP LANG VOICE COMM INDIV: CPT | Mod: GN,GC | Performed by: SPEECH-LANGUAGE PATHOLOGIST

## 2023-02-22 NOTE — OP THERAPY DAILY TREATMENT
Outpatient Speech Therapy  DAILY TREATMENT     [facility] SPEECH PATHOLOGY AND AUDIOLOGY  [address] [place] [id number]  Phone:  [phone]  Fax:  [phone]    Date: 02/21/2023    Patient: Janneth Lopez  YOB: 1985  MRN: [id number]     Time Calculation    Start time: 1500  Stop time: 1600 Time Calculation (min): 60 minutes         Chief Complaint: Speech Therapy    Visit #: 4/90    Subjective Evaluation  Janneth arrived on time and independently to the session. They stated they were not feeling good today due to a migraine but stated they were willing to still work through during today's session.     This visit was supervised by a licensed and certified speech-language pathologist, who was available for 100% of the session.    Speech Therapy Objective    The Verbal Communication and Nonverbal Communication sections of Session 1 were implemented. Session 2 of the VC Program was implemented for the other target areas  during today's session in order to reinforce proper warm-up techniques for vocal exercises and practice vocal aspects including oral forward resonance, breathiness, verbal communication, and nonverbal communication with a new target pitch of 190 Hz.     Janneth asked if they should keep their voice up the entire session during exercises.  The clinician said this would be a good goal to reach and they could practice as much as they would like during the session. Janneth did not keep their voice at 190 Hz in between tasks.     Review Assigned Homework:  Janneth reported that their completion of homework from the previous week's session went well, yet felt exhausting.They stated they felt they were getting better at practicing breathiness.      Short Term Goal 1: Vocal Relaxation Exercises  (Session 2 targets) Today's session focused on reducing any jaw, tongue, larynx, and shoulder tension in preparation for vocal exercises. Janneth practiced the following vocal  relaxation techniques that were introduced during the previous session: Laryngeal Massage and Yawn-Sigh. The purpose of each exercise was discussed with Janneth, and they did not require the clinician to model of each as they reported feeling comfortable after practicing the exercises for homework.     Short Term Goal 2: Breath Support Exercises  (Session 2 targets) Today's session focused on increasing Janneth's awareness of breath support techniques in order to coordinate breathing and phonating as well as increase their vital lung capacity. Janneth practiced the following vocal relaxation techniques: Yoga Breathing and Snuff-Hiss. The purpose of each exercise was discussed with Janneth, and they did not require a clinician model of each as they reported feeling comfortable after practicing the exercises for homework.     Short Term Goal 3: Increase Speaking Fundamental Frequency   (Session 2 targets) Today's session focused on safely increasing Janneth's target pitch and semitone range while phonating through a linguistic hierarchy. Janneth's pitch was increased by 10 Hz and calculated to be G3 (190 Hz) with a corresponding 5-note scale of E3, F3, G3, G3#, and A3. First, they hummed to match her target pitch (C3#, 190 Hz), then hummed to match their lower target pitch (E3). They then hummed notes in an ascending scale (E2, F2, G3), and then again in an ascending-descending scale (E3, F3, G3, G3#, A3). Lastly, they hummed all five notes in an ascending-descending scale (E3, F3, G3, G3#, A3 to A3, G3#, G3, F3, E3) and then again in a varying order (A3, E3, G3#, F3, G3). Janneth was provided with a demonstration for each exercise, practice with support, and the opportunity to practice independently.     Short Term Goal 4: Oral-Forward Resonance  (Session 2 targets) Today's session focused on moving resonance of sound from the chest to the head and facial area by increasing awareness and the feeling of forward oral resonance.  "Janneth practiced the following warm-up techniques that were introduced during the previous session: neck stretching, shoulder stretching, and lip-trills with and without voicing. They practiced humming /m/ at their target pitch (G3). Janneth then practiced chanting /m/, /i/, and /mi: mi/ at their target pitch (G3) to achieve an oral-forward resonance. Janneth additionally practiced chanting words (\"Meet me\") and short sentences (\"Meet me, Peter.\") with naturally rising intonation. They were provided with a demonstration for each exercise, practice with support, and the opportunity to practice independently.     Short-Term 5: Breathiness  (Session 2 targets) Today's session focused on adding a breathy quality to Janneth's voice to add an element of femininity. Janneth practiced using exercises to improve their awareness of breathiness including exhaling /h/, /ha/, and /hu/.       Short-Term 6: Feminine Verbal Aspects of Communication   (Session 1 targets)   Extending vowels  Janneth practiced speaking slower and extending the vowels in the words \"bat\", \"time\", \"shop\", and \"hat\" with clinician support.     Rising intonation  Janneth was given exercises to improve their awareness of intonation, pauses and exaggeration during speaking tasks. They initially read the SM10: \"[place]\" passage to practice these components together and again a second time after clinician feedback and reflection.      Short Term Goal 7: Feminine Nonverbal Aspects of Communication  Nodding & Leaning  Janneth practiced nodding and leaning forward during conversation tasks while facing the clinician. They then practiced having a conversation while mirroring the clinician's movements, and then they practiced the movements independently. They used a mirror to visualize themselves fro feedback.      Fluid movements  Janneth practiced moving their wrists fluidly while keeping their elbows close to their trunk. They did this by mimicking the prompts \"conduct a " "symphony\" and \"figure eight\" to practice moving their wrist up, down, and over while seated.     Short Term Goal 8: Vocal Quality and Vocal Hygiene   Janneth documented their daily water intake to range from 64 to 192 ounces during the previous week., with an average of 120 ounces a day. Their voice felt good this last week when they practived the vocal exercises at home.     Assessments  Short-Term Goal 1: Vocal Relaxation Exercises  Janneth completed vocal relaxation exercises with minimal clinician assistance. They reported these exercises to be easy for them after practicing them daily for homework. Beckiid ensured to drop their shoulders and release any tension upon exhaling during the laryngeal massage and yawn-sigh vocal relaxation exercises.      Short Term Goal 2: Breath Support Exercises. Janneth completed yoga breathing and snuff hiss breath support exercises independently and without clinician assistance. The clinician had Janneth use the wall for biofeedback for the snuff hiss exercise. They reported these exercises to be easy for them after practicing them daily for homework.      Short Term Goal 3: Increase Speaking Fundamental Frequency.  During the pitch exercises, Janneth demonstrated ease achieving the target pitch (190 Hz) similar to the previous session (180 Hz).Beckiid stated they liked practing notes in ascending and descending order as it felt more natural. Janneth required minimal clinician assistance during pitch matching tasks.    Short Term Goal 4: Oral-Forward Resonance. Janneth completed the warm-up resonance exercises independently. Janneth continued to use their hands for lip trills even though it was not encouraged. The clinician used the [facility] vanessa help Janneth meet to the target pitch of 190 Hz while performing the chanting sentences activity.    Short-Term 5: Breathiness. Janneth demonstrated ease during exhalation exercises of /h:/, /ha:/, and /ho:/. They placed their hand on their throat as a tactile " "cue to monitor vocal fold vibration during the exhalation exercises of /h:/. When practicing \"Jenny\", Janneth tended to sing/chat rather than saying it. After the reminder, they were able to say \"Jenny.\"     Short-Term 6: Feminine Verbal Aspects of Communication   Extending vowels  Janneth demonstrated ease of vowel prolongation at the word level. However, during the passage reading, Janneth read quickly and the clinician had to model a much slower rate of speaking to elongate the vowels. Beckiid stated that they disliked the slow rate and preferred to read faster to sound more natural. The clinician helped them identify places to elongate vowels such as adjectives and gave them feedback on their performance.      Rising intonation  Janneth practiced rising intonation, exaggeration, and pauses while reading a short passage. They were able to take pauses, extend their vowels, and use varying intonation during reading tasks with moderate clinician modeling. Janneth stated that reading slowly felt unnatural it was most difficult for them to focus on taking pauses every four to six words as it felt unnatural to them to speak in that manner. Additionally, it sounded liked Janneth was reading in a \"character\" voice rather than a natural speaking voice.     Short Term Goal 7: Feminine Nonverbal Aspects of Communication  Nodding & Leaning  Janneth easily nodded their head during conversational tasks with the clinician.Janneth reported that they tilt their head a lot when speaking and that head nodding can be similar to this.      Fluid movements  Janneth easily demonstrated fluid movements of the wrists, arms, and elbows. They reported that they often \"talk with their hands\" and move their wrists frequently during conversation. These movements are feminine in nature.    Short Term Goal 8: Vocal Quality and Vocal Hygiene   Janneth was encouraged to continue monitoring their daily water intake on their new assigned homework sheet for the " upcoming week.       Speech Therapy Plan    -Review homework from Session 2 of the VC Program  -Vocal Relaxation:  review vocal relaxation exercises  -Breath Support: review breath support exercises  -Continue using clinician models while introducing new tasks.   -Pitch: Session 3 Begin matching new target pitch of 200 Hz and practicing humming the corresponding 5-note scale of E3, F3# ,G3#, A3, B3 in ascending and descending orders with a focus on an oral forward resonance. Use visual cueing (e.g., moving hands up for 'ascending' and down for 'descending') during pitch matching tasks.  -Resonance:  Session 3:sing numbers in ascending and descending scale order   -Breathiness: Session 3: breathiness in sentences  -Verbal Communication: Session 2 rising intonation. Stress intonation during verbal communication tasks and increase Vivid's awareness of their intonation usage. Session 3 rising intonation. Adding adverbial, condition, and purpose clauses during conversation.  -Nonverbal Communication: Session 2 hand and arm movement.  Target nonverbal communication using fluid movements and introduce eye contact. Session 3 eye contact.   - Assign homework to encourage Vivid to practice the techniques and strategies learned in therapy to generalize outside of the therapeutic context.           [x]As the licensed therapist supervising this student, I was present during the entire treatment session directing the care and reviewing the assessment plan.  I reviewed all documentation prior to signing.

## 2023-03-03 ENCOUNTER — TELEPHONE (OUTPATIENT)
Dept: SPEECH THERAPY | Facility: OTHER | Age: 38
End: 2023-03-03
Payer: COMMERCIAL

## 2023-03-03 NOTE — OP THERAPY DAILY TREATMENT
Called PT twice on Thursday 3/2, I could not leave a message the first time due to vm box being full.     Called around 4pm again and seemed like PT picked up but I couldn't hear anything. Hung up after 30-40 seconds.     Emailed patient Friday 3/3 to discuss jovanna funds for speech therapy. Will continue to try and reach out until Friday 3/10 if I have not heard back.

## 2023-03-07 ENCOUNTER — SPEECH THERAPY (OUTPATIENT)
Dept: SPEECH THERAPY | Facility: OTHER | Age: 38
End: 2023-03-07
Payer: COMMERCIAL

## 2023-03-07 DIAGNOSIS — R49.8 OTHER VOICE AND RESONANCE DISORDERS: ICD-10-CM

## 2023-03-07 PROCEDURE — 92507 TX SP LANG VOICE COMM INDIV: CPT | Mod: GN,GC | Performed by: SPEECH-LANGUAGE PATHOLOGIST

## 2023-03-08 DIAGNOSIS — F90.0 ATTENTION DEFICIT HYPERACTIVITY DISORDER (ADHD), PREDOMINANTLY INATTENTIVE TYPE: ICD-10-CM

## 2023-03-08 RX ORDER — LISDEXAMFETAMINE DIMESYLATE 20 MG/1
20 CAPSULE ORAL DAILY
Qty: 30 CAPSULE | Refills: 0 | Status: SHIPPED
Start: 2023-03-08 | End: 2023-03-14

## 2023-03-08 NOTE — OP THERAPY DAILY TREATMENT
Outpatient Speech Therapy  DAILY TREATMENT     [facility] SPEECH PATHOLOGY AND AUDIOLOGY  [address] [place] [id number]  Phone:  [phone]  Fax:  [phone]    Date: 03/07/2023    Patient: Janneth Lopez  YOB: 1985  MRN: [id number]     Time Calculation    Start time: 1500  Stop time: 1600 Time Calculation (min): 60 minutes         Chief Complaint: Speech Therapy    Visit #: 5/12    Subjective Evaluation    Janneth arrived on time and independently to the session. They were engaged and attempted using a higher voice during the session. They stated they were happy to be back after having two weeks off due to weather cancellations.       This visit was supervised by a licensed and certified speech-language pathologist, who was available for 100% of the session.    Speech Therapy Objective     The Nonverbal Communication sections of Session 2 were implemented. Session 3 of the VC Program was implemented for the other target areas during today's session in order to reinforce proper warm-up techniques for vocal exercises and practice vocal aspects including oral forward resonance, breathiness, verbal communication, and nonverbal communication with a new target pitch of 220 Hz.      The clinician previously planned to have Janneth reach for a target pitch of 200 Hz during the session, but they stated they had been practicing a higher pitch of A3 or 220 Hz. We decided it would be appropriate to work on that pitch during the session.     Review Assigned Homework:  Janneth reported that they did not complete their homework because they were unsure if there were going to continue therapy due to financial issues.        Short Term Goal 1: Vocal Relaxation Exercises  (Session 3 targets) Today's session focused on reducing any jaw, tongue, larynx, and shoulder tension in preparation for vocal exercises. Janneth practiced the following vocal relaxation techniques: Laryngeal Massage,  Yawn-Sigh, and Tongue Protrusion, which was newly introduced this session. The purpose of each exercise was discussed with Janneth, and they completed  Laryngeal Massage and Yawn-Sigh independently. Tongue Protrusion required the clinician to teach it before Janneth was able to do it on their own.     Short Term Goal 2: Breath Support Exercises  (Session 3 targets) Today's session focused on increasing Janneth's awareness of breath support techniques in order to coordinate breathing and phonating as well as increase their vital lung capacity. Janneth practiced the following vocal relaxation techniques: Yoga Breathing and Snuff-Hiss. The purpose of each exercise was discussed with Janneth, and they did not require a clinician model.    Short Term Goal 3: Increase Speaking Fundamental Frequency   (Session 3 targets) Today's session focused on safely increasing Janneth's target pitch and semitone range while phonating through a linguistic hierarchy. Janneth's pitch was increased by 30 Hz and calculated to be A3 (220 Hz) with a corresponding 5-note scale of  F3, G3, A3, A3#, and C4. First, they hummed to match their target pitch (A3, 220 Hz), then hummed to match their lower target pitch (F3). They then hummed notes in an 3 note ascending scale (F3, G3, A3), and then 3 note descending scale (A3, G3,F3). Lastly, they hummed all five notes in an ascending-descending scale (F3, G3, A3, A3#, C4) and then again in a varying order (C4, F3, A3#, G3, A3). Janneth was provided with a demonstration for each exercise, practice with support, and the opportunity to practice independently.     Short Term Goal 4: Oral-Forward Resonance  (Session 3 targets) Today's session focused on moving resonance of sound from the chest to the head and facial area by increasing awareness and the feeling of forward oral resonance. Janneth practiced the following warm-up techniques that were introduced during the previous session: neck stretching, shoulder stretching,  "and lip-trills with and without voicing. They practiced humming /m/ at their target pitch (A3). Janneth then practiced chanting /m/, /i/, and /mi: mi/ at their target pitch (A3) to achieve an oral-forward resonance. Janneth additionally practiced chanting words (\"Meet me\") and short sentences (\"Meet me, Peter.\") with naturally rising intonation. They were provided with a demonstration for each exercise, practice with support, and the opportunity to practice independently.     Short-Term 5: Breathiness  (Session 3 targets) Today's session focused on adding a breathy quality to Janneth's voice to add an element of femininity. Janneth practiced using exercises to improve their awareness of breathiness including exhaling /h/, /ha/, and /hu/.  They easily added a breathy quality to their voice with sentences such as \"Jenny has a hat,\" \"How do you do?\" and \"How is Jenny now that Rhett is gone?\"      Short-Term 6: Feminine Verbal Aspects of Communication   (Session 3 targets)   Rising intonation  Janneth was given exercises to practice intonation at the end of a sentence. They read the SM12 \"The Walrus and the Riley\" to practice these components.The clinician modeled and  hen Janneth worked independently.    Adverbial Clauses   Janneth practiced using adverbial clauses such as \"as long as,\" \"because,\" and \"in case\" in sentences and in conversation.     Short Term Goal 7: Feminine Nonverbal Aspects of Communication  (Session 3 targets)   Eye contact   Janneth practiced holding and breaking eye contact in conversation with the clinician.    Short Term Goal 7: Feminine Nonverbal Aspects of Communication  (Session 2)    Hand and Arm Movements   Janneth practiced keeping their hand and arm movements close to the body, keeping them curved, and fluid. Ribbons were used to practice fluid movements while picking up beads were used to practice keeping the hands cupped and slightly fanning out fingers. These activities were practiced " "individually and together in conversation.     Smiling   Janneth practiced answering questions while smiling.     Assessments  Short-Term Goal 1: Vocal Relaxation Exercises  Janneth completed vocal relaxation exercises Laryngeal Massage and Yawn-Sigh independently. The clinician modeled the Tongue Protrusion exercise and Janneth tried it independently. They stated it was diffult to say /i/. They also used a mirror to help them position their tongue correctly.      Short Term Goal 2: Breath Support Exercises. Janneth completed yoga breathing and snuff hiss breath support exercises independently and without clinician assistance. Janneth had strong diaphramatic breathing during both exercises.     Short Term Goal 3: Increase Speaking Fundamental Frequency.  During the pitch exercises, Janneth easily achieved the target pitch (220 Hz) similar to the previous session (190 Hz).Beckiid stated they liked practing notes in ascending and descending order as it felt more natural. Janneth completed most tasks independently and required minimal clinician assistance during few pitch matching tasks.     Short Term Goal 4: Oral-Forward Resonance. Janneth completed the warm-up resonance exercises independently. Janneth did not use their hands to help them produce lip trills, like they did in previous sessions. The clinician used the piano vanessa help Janneth meet to the target pitch of 220 Hz while performing the chanting sentences activity.     Short-Term 5: Breathiness. Janneth demonstrated ease during exhalation exercises of /h:/, /ha:/, and /ho:/. They completed the practice exercises with ease. The Voice[employer] and piano vanessa were used to help Beckiid remain at their target pitch.      Short-Term 6: Feminine Verbal Aspects of Communication   (Session 3 targets)   Rising intonation  The clinician modeled this task before Beckiid attempted it on their own. Janneth used a \"narrator voice\" to complete some of this exercise. The clinician asked them to try the " "activity with their regular speaking voice. After discussing the difference between their speaking voice and \"narrator voice\", Janneth realized for the first time that they were using a narration voice while they were regularly speaking. After this, they opted for a more natural speaking voice and used rising intonation at the end of each sentence.They also remarked that the punctuation threw them off and it was easier not to look at the sentences because of the punctuation.     Adverbial Clauses   Janneth practiced using adverbial clauses such as \"as long as,\" \"because,\" and \"in case\" in sentences and in conversation. These were easy for them to use during conversation. They mentioned that they frequently use clauses such as these in their speech.    Short Term Goal 7: Feminine Nonverbal Aspects of Communication  (Session 3 targets)   Eye contact   Janneth easily held and broke eye contact when engaging in conversation with the clinician. They stated that they avoid or have less eye contact when in conversation with others.      Short Term Goal 7: Feminine Nonverbal Aspects of Communication  (Session 2)     Hand and Arm Movements   Janneth practiced keeping their hand and arm movements close to the body, keeping them curved, and fluid. Ribbons were used to practice fluid movements while picking up beads were used to practice keeping the hands cupped and slightly fanning out fingers. However, Janneth opted out of using ribbons so hand movements felt more natural. These activities were practiced individually and together in conversation. Janneth typically exhibits fluid movements naturally in the therapy session.    Smiling   Janneth practiced answering questions while smiling. They stated this was easy to do and felt that in natural conversation they held a more subtle smile.      Short Term Goal 8: Vocal Quality and Vocal Hygiene   Janneth was encouraged to continue monitoring their daily water intake on their new assigned " homework sheet for the upcoming week.     Speech Therapy Plan  -Review homework from Session 3 of the VC Program  -Implement Session 4 of the VC Program   -Vocal Relaxation:  Review vocal relaxation exercises  -Breath Support: Review breath support exercises  -Continue using clinician models while introducing new tasks.   -Pitch: Begin matching new target pitch of 230 Hz (A3#) and practicing humming the corresponding 5-note scale of F3# ,G3#, A3#, B3, C4 in ascending and descending orders with a focus on an oral forward resonance.   -Resonance: Pitch matching numbers in ascending and descending scale order   -Breathiness: Practice breathiness in sentences  -Verbal Communication: Review rising intonation, and introduce Tag questions   -Nonverbal Communication: Practice exercises that focus on head, trunk, and arm movements, smiling, and eye contact all at once  - Assign homework to encourage Vivid to practice the techniques and strategies learned in therapy to generalize outside of the therapeutic context.     [x] As the licensed therapist supervising this student, I was present during the entire treatment session directing the care and reviewing the assessment plan.  I reviewed all documentation prior to signing.

## 2023-03-08 NOTE — TELEPHONE ENCOUNTER
Patient trialed adderal and dexedrine.  Due to availability, unable to continue treatment.  Recommending vyvanse as alternative due to availability concerns.

## 2023-03-14 ENCOUNTER — SPEECH THERAPY (OUTPATIENT)
Dept: SPEECH THERAPY | Facility: OTHER | Age: 38
End: 2023-03-14
Payer: COMMERCIAL

## 2023-03-14 DIAGNOSIS — R49.8 OTHER VOICE AND RESONANCE DISORDERS: ICD-10-CM

## 2023-03-14 DIAGNOSIS — F90.9 ATTENTION DEFICIT HYPERACTIVITY DISORDER (ADHD), UNSPECIFIED ADHD TYPE: ICD-10-CM

## 2023-03-14 PROCEDURE — 92507 TX SP LANG VOICE COMM INDIV: CPT | Mod: GN,GC | Performed by: SPEECH-LANGUAGE PATHOLOGIST

## 2023-03-14 RX ORDER — LISDEXAMFETAMINE DIMESYLATE 30 MG/1
30 CAPSULE ORAL EVERY MORNING
Qty: 30 CAPSULE | Refills: 0 | Status: SHIPPED
Start: 2023-03-14 | End: 2023-04-11

## 2023-03-14 NOTE — OP THERAPY DAILY TREATMENT
Outpatient Speech Therapy  DAILY TREATMENT     [facility] SPEECH PATHOLOGY AND AUDIOLOGY  [address] [place] [id number]  Phone:  [phone]  Fax:  [phone]    Date: 03/14/2023    Patient: Janneth Lopez  YOB: 1985  MRN: [id number]     Time Calculation    Start time: 1600  Stop time: 1700 Time Calculation (min): 60 minutes         Chief Complaint: Speech Therapy    Visit #: 6/12    Subjective Evaluation    Janneth arrived on time and independently to the session. They appeared to be in a good mood and shared they were going to submit some artwork to a showcase at school. They were engaged during the session and shared when they understood a task and needed support.      This visit was supervised by a licensed and certified speech-language pathologist, who was available for 100% of the session    Speech Therapy Objective     Session 4 of the VC Program was implemented for the target areas during today's session to reinforce proper warm-up techniques for vocal exercises and practice vocal aspects including oral forward resonance, breathiness, verbal communication, and nonverbal communication with a new target pitch of 230 Hz or A3#.     Review Assigned Homework:  Janneth reported that they did not complete their homework because they were unsure if there were going to continue therapy due to financial issues.        Short Term Goal 1: Vocal Relaxation Exercises  Today's session focused on reducing any jaw, tongue, larynx, and shoulder tension in preparation for vocal exercises. Janneth practiced the following vocal relaxation techniques: Laryngeal Massage, Yawn-Sigh, and Tongue Protrusion, which was newly introduced this session. The purpose of each exercise was discussed with Janneth, and they completed  Laryngeal Massage and Yawn-Sigh independently. Tongue Protrusion required the clinician to teach it before Janneth was able to do it on their own.      Short Term Goal 2: Breath  "Support Exercises  Today's session focused on increasing Janneth's awareness of breath support techniques in order to coordinate breathing and phonating as well as increase their vital lung capacity. Janneth practiced the following vocal relaxation techniques: Yoga Breathing and Snuff-Hiss. The purpose of each exercise was discussed with Janneth, and they did not require a clinician model.     Short Term Goal 3: Pitch Exercises   Today's session focused on safely increasing Janneth's target pitch and semitone range while phonating through a linguistic hierarchy. Janneth's pitch was increased by 10 Hz and calculated to be A3# (230 Hz) with a corresponding 5-note scale of  F3#, G3#, A3#, B3, and C4#. First, they sang numbers from the lowest note (F3#) to the highest note (C4#). Janneth then sang numbers in an ascending scale order (B2, D3#, F3#) then in a descending scale order (F3#, D3#, B2). Janneth sang all five notes in varying order (F3#, B2, E3, C3#, D3#). They additionally practiced singing various sentences in an ascending-descending scale order (e.g., \"Meet me, Peter, meet me\") (F3#, G3#, A3#, B3, C4#, C4#, B3, A3#, G3#,  F3#). They were provided with a demonstration for each exercise, practice with support, and the opportunity to practice independently     Short Term Goal 4: Oral-Forward Resonance  Today's session focused on moving resonance of sound from the chest to the head and facial area by increasing awareness and the feeling of forward oral resonance. Janneth reviewed and practiced the following warm-up techniques independently: neck stretching, shoulder stretching, and lip-trills with and without voicing. They practiced incorporating naturally rising intonation during short sentences (e.g., \"Meet me, Peter, meet me\") and while answering questions during structured conversation (e.g., \"Where are you from?\"). They were provided with a demonstration for each exercise, practice with support, and the opportunity to " "practice independently.    Short-Term 5: Feminine Verbal Aspects of Communication   Breathiness  Today's session focused on adding a breathy quality to Janneth's voice to add an element of femininity. They practiced improving their awareness of breathiness in short questions (\"How do you do?\") as well as in long questions (\"If you could be a vegetable, which one would you be any why?\").     Short-Term 6: Feminine Verbal Aspects of Communication     Rising intonation  Janneth was given exercises to practice intonation at the end of a sentence. They read the SM13 \"Nonsense Alphabet\" to practice these components.The clinician modeled then Janneth worked independently.     Tag Questions   Janneth practiced using tag questions such as \"isn't it,\" \"are they?\" and \"will you?\" in sentences and in conversation.      Short Term Goal 7: Feminine Nonverbal Aspects of Communication    Putting it all together  Janneth practiced putting all the aspects of non-verbal communication they learned such as head, trunk, and arm movement; smiling; eye contact) in a mock sales pitch with the clinician.      Assessments    Short-Term Goal 1: Vocal Relaxation Exercises  Janneth completed vocal relaxation exercises Laryngeal Massage and Yawn-Sigh independently. The clinician modeled the Tongue Protrusion exercise and Janneth tried it independently.They used a mirror to help them position their tongue correctly.     Short Term Goal 2: Breath Support Exercises. Janneth completed yoga breathing and snuff hiss breath support exercises independently. They noted they had forgotten to hold their breath between inhaling and exhaling for the yoga breathing activity and began to implement this. Janneth had strong diaphramatic breathing during both exercises.     Short Term Goal 3: Pitch Exercises    During the pitch exercises, Janneth easily achieved the target pitch (230 Hz) similar to the previous session (220 Hz). Janneth completed most tasks independently and required " "minimal clinician assistance to match pitch.    Short Term Goal 4: Oral-Forward Resonance. Janneth completed the warm-up resonance exercises independently. Janneth noted that their pitch dropped when they answered questions with answers that contained several sentences. The clinician suggested to keep their response to a sentence or two. Use of the piano vanessa helped Janneth meet the target pitch of 230 Hz. The Voice Tools vanessa was used to monitor pitch drops in Janneth's voice.     Short-Term 5: Feminine Verbal Aspects of Communication   Breathiness    Janneth demonstrated ease practicing breathiness in short questions (\"How do you do?\") as well as in long questions (\"If you could be a vegetable, which one would you be any why?\"). The exercises were completed independently and minimal clinician support.     Short-Term 6: Feminine Verbal Aspects of Communication     Rising intonation  The clinician modeled these exercise and Janneth then did the exercise independently. Feedback was provided to help Janneth identify what words they wanted to emphasize or stress. Janneth stated that the reading the clinician provided made them feel like they needed to use their \"narrator voice\" because it was more story-like. The clinician explained that practicing in their normal speaking voice, despite reading a story, would beneficial to apply rising intonation in all types of settings. The clinician also encouraged Vivid to bring reading that would be more of interest to them.     Tag Questions   Janneth stated that the given tag questions were hard to say in sentences. They preferred practicing them in isolation. They stated they often use tag questions in their everyday speech but not like the examples we provided. Janneth appeared to have an easier time using rising intonation with the tag questions because they are formed as a question and it was easier to emphasize that part of the sentence.      Short Term Goal 7: Feminine Nonverbal Aspects of " "Communication    Putting it all together   The clinician gave Vivid feedback on their use of non-verbal communication such as head, trunk, and arm movement; smiling; and eye contact when giving the \"sales pitch\". The clinician noticed some rigidity in arm movements but also noticed good eye contact and smiling. Vivid stated they used rigid arm movements because they were playing the part of a salesperson and they perceived that as authoritative. The clinician clarified that while this may be true, the goal of the session was to focus on practicing feminine nonverbal communication.     Speech Therapy Plan    -Review homework from Session 4 of the VC Program  -Implement Session 5 of the VC Program   -Vocal Relaxation:  Review vocal relaxation exercises  -Breath Support: Review breath support exercises  - Monitor Janneth's breathing during yoga breathing exercise   -Pitch: Continue with target pitch of 230 Hz (A3#) and practicing humming the corresponding 5-note scale of F3# ,G3#, A3#, B3, C4 in ascending and descending orders with a focus on an oral forward resonance.   -Resonance: Pitch matching numbers in ascending and descending scale order   -Breathiness: Practice breathiness in sentences  -Verbal Communication: Review rising intonation, tag questions, and clauses   -Nonverbal Communication: Practice exercises that focus on stance and gait/walk/stride   - Assign homework to encourage Vivid to practice the techniques and strategies learned in therapy to generalize outside of the therapeutic context.     [x] As the licensed therapist supervising this student, I was present during the entire treatment session directing the care and reviewing the assessment plan.  I reviewed all documentation prior to signing.              "

## 2023-03-28 ENCOUNTER — SPEECH THERAPY (OUTPATIENT)
Dept: SPEECH THERAPY | Facility: OTHER | Age: 38
End: 2023-03-28
Payer: COMMERCIAL

## 2023-03-28 DIAGNOSIS — R49.8 OTHER VOICE AND RESONANCE DISORDERS: ICD-10-CM

## 2023-03-28 PROCEDURE — 92507 TX SP LANG VOICE COMM INDIV: CPT | Mod: GN,GC | Performed by: SPEECH-LANGUAGE PATHOLOGIST

## 2023-03-29 NOTE — OP THERAPY PROGRESS SUMMARY
Outpatient Speech Therapy  PROGRESS SUMMARY NOTE      [facility] SPEECH PATHOLOGY AND AUDIOLOGY  [address] [address]  Phone:  [phone]  Fax:  [phone]    Date of Visit: 03/28/2023    Patient: Janneth Lopez  YOB: 1985  MRN: [id number]     Referring Provider: BORA Brambila, [facility]      Referring Diagnosis F64.9 Gender Identity Disorder, Unspecified     Visit #: 7/12    Progress Report Period: January 31, 2023 to March 28, 2023    Time Calculation    Start time: 1500  Stop time: 1600 Time Calculation (min): 60 minutes         Chief Complaint: Speech Therapy    Visit Diagnoses     ICD-10-CM   1. Other voice and resonance disorders  R49.8   This note serves as a SOAP and a Progress Note representing the Spring 2023 Treatment Period from 1/31/2023 to 3/28/2023. This was Janneth's first treatment period participating in speech therapy intervention services at the [facility].    Janneth arrived to the session on time and independently.  Janneth has 100% attendance by attending every session during the treatment period (7/7 visits). They reported they had enjoyed spending the last week drawing and working on their artwork. They are working on a submission for an art exhibit for school. They were engaged and appeared to be in a good mood during the session.       Session 5 of the VC Program was implemented for the target areas during today's session to reinforce proper warm-up techniques for vocal exercises and practice vocal aspects including oral forward resonance, breathiness, verbal communication, and nonverbal communication with a new target pitch of 230 Hz or A3#.     Review Assigned Homework:  Janneth reported that they did not complete their homework last week. They stated they were having difficulty due to change of medication and not having enough time to do it.      Long-Term Goal 1: Janneth will achieve  a more feminine, gender-congruent voice in a safe and efficient manner.   Goal Status: Not met     Short-Term Goal 1: Janneth will participate in vocal relaxation exercises to prepare the vocal mechanism for modifications with minimal clinical support by the end of the treatment period.   Goal status: Met   Baseline Data: Initially, Janneth was not participating in vocal relaxation exercises and required clinician support.   Current Data: Janneth conducted vocal relaxation exercises independently. They consistently documented completion of vocal relaxation exercises independently on their weekly assigned homework tracking sheets. Janneth completed these exercises during the session without assistance from the clinician. Janneth will be encouraged to independently maintain this ability each week for homework and future treatment sessions to warm up their vocal mechanism before engaging in voice-related tasks.  Approaches Used: Vocal relaxation exercises focused on reducing any jaw, tongue, larynx, and shoulder tension in preparation for vocal exercises. Janneth reported ease initially performing laryngeal exercises following clinician demonstration. The following vocal relaxation exercises were taught: Laryngeal Massage, Yawn-Sigh, and Tongue Protrusion. This goal was met with supported practice with the clinician during each treatment session in at least five opportunities and by completing the exercises on the weekly assigned homework tracking sheets.        Short-Term Goal 2: Janneth will participate in breath support exercises to prepare the vocal mechanism for modifications with minimal clinician support by the end of the treatment period.   Goal Status: Met   Baseline Data: Initially, Janneth was not participating in breath support exercises and required clinician support.   Current Data: Jannethconducted breath support exercises independently. They consistently documented completion of breath support exercises independently on  their weekly assigned homework tracking sheets. Vivid will be encouraged to independently maintain this ability during the time off between treatment periods as well as during future treatment sessions to warm up their vocal mechanism before engaging in voice-related tasks.  Approaches Used: Breath support exercises focused on increasing Vivid's awareness of breath support techniques in order to coordinate breathing and phonating as well as increase their vital lung capacity. Vivid reported ease initially performing breath support exercises following clinician demonstration. The following breath support exercises were taught: Yoga Breathing and Snuff-Hiss. This goal was met with supported practice with the clinician during each treatment session in at least five opportunities and by completing the exercises on the weekly assigned homework tracking sheets.       Short-Term Goal 3: Vivid will increase their average speaking fundamental frequency to 210 Hz at the word level in 4/5 opportunities with minimal clinician support by the end of the treatment period.   Goal Status: Met  Baseline Data: Janneth 's initial target pitch at the beginning of the treatment period was 180 Hz.   Current Data: Vivid increased their target pitch to 230 Hz at the word level in 4/5 opportunities with a corresponding 5-note scale of F3#, G3#, A3#, B3, and C4#. Vivid will be encouraged to independently maintain this target pitch at the multiple sentence level during future treatment sessions while engaging in voice-related tasks.  Approaches Used: Increasing speaking fundamental frequency focused on safely maintaining Janneth 's target pitch and semitone range while phonating through a linguistic hierarchy. Vivid explored their vocal range by focusing on their target pitch and corresponding five note scale using a piano application on a smart phone device. First, they sang numbers from the lowest note (F3#) to the highest note (F3#). Janneth then  "sang numbers in an ascending scale order (F3#, G3#, A3#) then in a descending scale order (A3#, G3#, F3#). Janneth hummed all five notes in varying order (F3#, G3#, A3#, B3, and C4#). They additionally practiced singing various sentences in an ascending-descending scale order (e.g., \"Meet me, Peter, meet me\"). Janneth additionally practiced singing notes in various multisyllabic words (e.g., \"apple\", \"banana\", \"carrot\", \"donut\", \"eggplant\", \"artichoke\") and within short sentences (e.g., \"How are you?\") at their target pitch using natural intonation. Janneth completed singing notes, saying novel words, and saying short sentences in ascending and descending scale orders at their target pitch independently. Janneth and the clinician consistently used the Voice[employer] application to monitor their target pitch and modulate their voice as necessary based on the biofeedback that was provided.      Short-Term Goal 4: Janneth will produce an oral-forward resonance at the word level in 4/5 opportunities with minimal clinician support by the end of the treatment period.   Goal Status: Not met  Baseline Data: Initially, Janneth used chest resonance.   Current Data: Janneth used oral-forward resonance at the word level in 3/5 opportunities with minimal clinician support. Janneth used clinician feedback to help them achieve nasal resonance during conversation. Janneth was instructed to decrease their speaking rate during conversation to help achieve an oral-forward resonance. They consistently documented completion of oral-forward resonance exercises independently on their weekly assigned homework tracking sheets. Janneth will be encouraged practice these exercises during homework and as during future treatment sessions to warm up their vocal mechanism before engaging in voice-related tasks.  Approaches Used: Oral-forward resonance was targeted during this treatment period to focus on moving resonance of sound from the chest and nasal cavity to the " "head and facial area by increasing awareness and the feeling of forward oral resonance. Janneth reported difficulty throughout the treatment period performing oral-forward resonance exercises following clinician demonstration. The following oral-forward warm-up exercises were taught: neck stretching, shoulder stretching, and lip-trills with and without voicing. Janneth  practiced using oral-forward resonance while humming /m/ at their target pitch, chanting /m/ and /i/ at their target pitch, and chanting /mi: mi/ at their target pitch. Janneth also worked on using oral-forward resonance short during short sentences (e.g., \"Meet me, Peter, meet me\"), while answering questions during structured conversation (e.g., \"Where are you from?\"), and answering various prompts (e.g., \"Tell me about a time when you went on vacation\") with clinician feedback and support. Lastly, Janneth was encouraged to record themselves speaking and then listen to the recording as a form of auditory feedback to provide insight on their perception of their resonance.      Short-Term Goal 5: Janneth will use more feminine verbal aspects of communication during structured tasks (i.e., rising intonation) in 4/5 opportunities with minimal clinician support by the end of the treatment period.   Goal Status: Met   Baseline Data: Initially, Janneth did not use breathiness or rising intonation in their voice but they spoke fast and used adverbial clauses in their speech.   Current Data: Janneth incorporated feminine verbal aspects of communication including breathiness, rising intonation, speaking rate, and adverbial clauses within structured conversation with ease during structured therapy tasks. Janneth incorporated breathiness, speaking rate, rising intonation, and adverbial clauses in 5/5 opportunities in structured tasks at the sentence level with minimal clinician support.    Approaches Used: Verbal aspects of communication were targeted during this treatment " "period in order to add an element of femininity to Janneth 's voice. Janneth demonstrated ease incorporating feminine verbal aspects of communication across this treatment period.  Janneth participated in structured conversation, focusing on their breathiness, rising intonation, good vocal quality, and target pitch. They responded to a variety of questions (e.g., \"What is your favorite holiday and why?\") during structured conversation. Janneth used various reading passages to increase the use of rising intonation to add emphasis and stress during speaking activities. Janneth additionally practiced using various tag questions and adverbial clauses to incorporate rising intonation during conversational exercises. They practiced speaking slowly by adding more pauses every four to six words and by prolonging the vowels in monosyllabic words (e.g., cat, climb, top, and team). Janneth reported that it is helpful for them to to speak at a slower rate to increase their awareness of incorporating breathiness, rising intonation, and short pauses during structured conversational tasks.         Short-Term Goal 6: Janneth will use more feminine nonverbal aspects of communication during structured tasks (i.e., gestures, head nods, gait) in 4/5 opportunities with minimal clinician support by the end of the treatment period.   Goal Status: Met  Baseline Data: Janneth initially demonstrated ease incorporating fluid movements of the wrists, hands, and fingers. Janneth additionally demonstrated ease using appropriate eye contact, heading nodding, and leaning forward during structured therapy tasks as they naturally have animated nonverbal communication behaviors.   Current Data: Janneth demonstrated ease incorporating various nonverbal components during a sales pitch exercise. Specifically, they used appropriate eye contact, head nodding, and leaned forward while speaking to the clinician. Janneth naturally used fluid hand movements when they talk. Janneth " did not have the opportunity to practice using components of nonverbal communication while participating in stance and gait exercises. Janneth requires further support and practice regarding achieving a feminine stance and gait.   Approaches Used: Nonverbal aspects of communication were targeted during this treatment period in order to add an element of femininity in Janneth 's head, trunk, hand, and arm movements. Janneth participated in various structured activities to practice incorporating feminine nonverbal aspects of communication including picking up dried beans and placing them into a cup, twirling ribbon, participating in a sales pitch exercise.     Short-Term Goal 7: The clinician will monitor Janneth's vocal quality and vocal hygiene in terms of water intake and vocal habits.  Goal Status: Met    Baseline Data: Initially, Janneth reported no vocal strain or hoarseness and drinking approximately 80 ounces of water a day.   Current Data: Janneth will drink water throughout sessions. Janneth averages drinking approximately 120 ounces per day, with a rage of 64 to 192 ounces per week as documented on their most recent weekly assigned homework tracking sheet.   Approaches Used: Janneth was educated on the importance of drinking enough water to maintain good vocal quality. Janneth was encouraged to continue carrying a reusable water bottle with them while they are at work and away from home. Janneth was encouraged to continue carrying a reusable water bottle with them while they are away from home to help monitor their daily water intake. They additionally documented their daily water intake on the weekly assigned tracking homework sheets. Janneth frequently took water breaks in between each exercise throughout the session to practice implementing good vocal hygiene.      Janneth was assigned the Homework 5 of the VC program to complete for the next week.         Speech Therapy Plan :   Goals  Short Term Goals:    Short-Term Goal 1:  Janneth will participate in vocal relaxation exercises to prepare the vocal mechanism for modifications independently in the session by the end of the treatment period.      Short-Term Goal 2: Janneth will participate in breath support exercises to prepare the vocal mechanism for modifications independently in the session by the end of the treatment period.     Short-Term Goal 3: Janneth will use an average speaking fundamental frequency of 230 Hz during two sentences in 4/5 opportunities with minimal clinician support by the end of the treatment period.      Short-Term Goal 4: Janneth will produce an oral-forward resonance at the word level in 4/5 opportunities with minimal clinician support by the end of the treatment period.      Short-Term Goal 5: Janneth will use more feminine verbal aspects of communication (i.e., breathiness, short bursts, extending vowels) during 2 to 3 sentences in 4/5 opportunities with minimal clinician support by the end of the treatment period.      Short-Term Goal 6: Janneth will use more feminine nonverbal aspects of communication (i.e., eye contact, fluid wrist/hands/fingers, stance, gait) during 2 to 3 sentences in 4/5 opportunities with minimal clinician support by the end of the treatment period.      Short-Term Goal 7: The clinician will monitor Janneth's vocal quality and vocal hygiene in terms of water intake and vocal habits.     Short Term Goal Duration (Weeks):  2-4 months  Long Term Goals:    Long-Term Goal 1: Janneth will achieve a more feminine, gender-congruent voice in a safe and efficient manner.   Long Term Goal Duration (Weeks):  4-6 months  Frequency:  1x week  Duration (in weeks):  20      Functional Assessment Used   Data collection    Referring provider co-signature:  I have reviewed this plan of care and my co-signature certifies the need for services.    Certification Period: 03/28/2023 to 06/27/23    Physician Signature: ________________________________ Date: ______________

## 2023-04-04 ENCOUNTER — SPEECH THERAPY (OUTPATIENT)
Dept: SPEECH THERAPY | Facility: OTHER | Age: 38
End: 2023-04-04
Payer: COMMERCIAL

## 2023-04-04 DIAGNOSIS — R49.8 OTHER VOICE AND RESONANCE DISORDERS: ICD-10-CM

## 2023-04-04 PROCEDURE — 92507 TX SP LANG VOICE COMM INDIV: CPT | Mod: GN,GC | Performed by: SPEECH-LANGUAGE PATHOLOGIST

## 2023-04-05 NOTE — OP THERAPY DAILY TREATMENT
Outpatient Speech Therapy  DAILY TREATMENT     [facility] SPEECH PATHOLOGY AND AUDIOLOGY  [address] [place] [id number]  Phone:  [phone]  Fax:  [phone]    Date: 04/04/2023    Patient: Janneth Lopez  YOB: 1985  MRN: [id number]     Time Calculation    Start time: 1500  Stop time: 1600 Time Calculation (min): 60 minutes         Chief Complaint: Speech Therapy    Visit #: 8/12    Subjective Evaluation    Janneth arrived on time and independently to the session. They appeared to be in a good mood and were engaged during the session. They brought a coffee and a water bottle to drink throughout the session.      This visit was supervised by a licensed and certified speech-language pathologist, who was available for 100% of the session.    Speech Therapy Objective    The Nonverbal Communication section of Session 5 was implemented. Session 6 of the VC Program was implemented for the target areas during today's session to reinforce proper warm-up techniques for vocal exercises and practice vocal aspects including oral forward resonance, breathiness, verbal communication, and nonverbal communication with a  target pitch of 230 Hz or A3#.       Review Assigned Homework:  Janneth reported that they did not complete their homework because the structure of it felt repetitive. Instead, they practiced using their voice in a higher pitch throughout their day.     Short Term Goal 1: Vocal Relaxation  (Session 6 targets) Janneth completed the following vocal relaxation tasks during the session: Laryngeal Massage, Yawn-Sigh, and Tongue Protrusion. They completed each exercise independently.    Short Term Goal 2: Breath Support  (Session 6 targets) Janneth completed the following breath support exercises during the session: Yoga Breathing and Snuff-Hiss. They completed each exercise independently.     Short Term Goal 3: Pitch  (Session 6 targets) Janneth used a speaking fundamental frequencies  "of 230 Hz (A3#) during the session. They used this pitch in novel words, sentences, and questions.  The novel words included: \"donut\", \"eggplant\", and \"artichoke\".  The novel sentences and questions included: \"Let's go out.\", \"How are you?\", \"Don't do that!\", \"Where is the restroom?\", and \"I'm doing great!\".  Janneth completed these tasks independently and incorporated other vocal elements like breathiness and oral resonance.     Short Term Goal 4: Forward-Oral Resonance  (Session 6 targets) Today's session assessed Janneth's current resonance skills. Janneth completed the following warm-up techniques independently: neck stretch, shoulder stretch, lip trills, and lip trills with downward sigh. Janneth answered the following prompts to practice maintaining forward-oral resonance: \"Tell me about a time when you went on vacation.\", \"Tell me what you would do if you won the lottery.\", \"What would you do if you opened a fortune cookie and you found a tiny map inside?\", \"What would you do if you were in the basement of your house and you discovered a secret passageway?\", and \"Tell me about a dream that you still remembered after you woke up.\" Janneth completed these tasks with minimal clinician support.     Short Term Goal 5: Verbal Behaviors in Structured Conversation  Breathiness  (Session 6 targets) Today's session assessed Janneth's ability to add a breathy quality to their voice to add an element of femininity. They practiced breathiness through answering the following tasks: \"Would you rather live in a cabin in the forest or a condo in a big city? Why?\", \"What do you do to get rid of stress?\", and \"If you had a personal theme song, what song would it be? Why?\". To add breathiness, she used the Yoga Breathing technique. Janneth completed all tasks with moderate clinician, while maintaining good vocal quality.      Card Game Activity  This activity focused on using all aspects of verbal communication alfonzo (speaking rate, " conversation elaboration, rising intonation, adverbial clauses, and tag questions) during spontaneous conversation. Janneth was also encouraged to use all nonverbal aspects of communication (head, trunk, and arm movement; smiling; eye contact).     Short Term Goal 6: Non-Verbal Communication  (Session 5 targets) This goal was targeted through focusing on exercises like stance and gait. The clinician explained gait differences between males and females. They practiced standing with palms up, straight shoulders, and standing with her feet standing together. They also practiced walking up and down a hallways with shorter and quicker steps, and walking with a narrow stance.      Short Term Goal 7: Vocal Hygiene  (Session 6 targets) Janneth documented their daily water intake to range from 64 to 192 ounces during the previous week., with an average of 120 ounces a day. Their voice felt good this last week when they practived the vocal exercises at home.       Assessments  Short-Term Goal 1: Vocal Relaxation Exercises  Janneth completed vocal relaxation exercises Laryngeal Massage, Yawn-Sigh, and Tongue Protrusion independently.     Short Term Goal 2: Breath Support Exercises. Janneth completed yoga breathing and snuff hiss breath support exercises independently. Janneth had strong diaphramatic breathing during both exercises.    Short Term Goal 3: Pitch  During the pitch exercises, Janneth achieved the target pitch (230 Hz) and a good range 20 dB above or below their target pitch (between 210 Hz and 260 Hz). Janneth completed most tasks independently and required minimal clinician assistance to match pitch.    Short Term Goal 4: Forward-Oral Resonance  Janneth completed the warm-up resonance exercises independently. They were able to maintain forward oral resonance during the exercises. Use of the [employer] vanessa helped Janneth meet the target pitch of 230 Hz. The [employer] vanessa was used to monitor pitch drops in Janneth's voice.    Short Term  Goal 5: Verbal Behaviors in Structured Conversation  Breathiness  Vivid demonstrated ease practicing breathiness in short questions. They lost some breathiness when they spoke for longer and the clinician used cues such as slowing down speaking rate to help them with this.     Card Game Activity  Vivid demonstrated use of all verbal communication elements (speaking rate, conversation elaboration, rising intonation, adverbial clauses, and tag questions) and maintain their target pitch, while playing a card game. They were also able to use nonverbal elements of communication like eye contact, smiling, and fluid hand movements.     Short Term Goal 6: Non-Verbal Communication  (Session 5 targets)   Janneth practiced walking with a narrow stance and shifting their weight from hip to hip as they walked. The clinician cued them to help them walk with a narrow stance but not too narrow so their feet cross over one another. The clinician also cued them to keep their shoulders straight and still as they walked. They stated that they would practice in a mirror at home.     Short Term Goal 7: Vocal Hygiene  Vivid drank water throughout their session as needed. They stated they drink lots of water throughout their day.     Speech Therapy Plan  -Review homework from Session 6 of the VC Program  -Implement Session 5 of the VC Program   -Vocal Relaxation:  Review vocal relaxation exercises  -Breath Support: Review breath support exercises  -Pitch: Continue with target pitch of 230 Hz (A3#) with a focus on an oral forward resonance.   -Resonance: Maintain oral forward resonance.   -Breathiness: Practice breathiness in sentences  -Verbal Communication: Implement slow speaking rate, rising intonation, tag questions, vowel prolongation, and clauses during conversation.   -Nonverbal Communication: Practice exercises that focus on stance and gait/walk/stride.   - Assign homework to encourage Vivid to practice the techniques and strategies  learned in therapy to generalize outside of the therapeutic context.       [x] As the licensed therapist supervising this student, I was present during the entire treatment session directing the care and reviewing the assessment plan.  I reviewed all documentation prior to signing.

## 2023-04-11 ENCOUNTER — OFFICE VISIT (OUTPATIENT)
Dept: BEHAVIORAL HEALTH | Facility: CLINIC | Age: 38
End: 2023-04-11
Payer: COMMERCIAL

## 2023-04-11 DIAGNOSIS — F64.0 TRANSGENDER PERSON ON HORMONE THERAPY: ICD-10-CM

## 2023-04-11 DIAGNOSIS — F90.0 ATTENTION DEFICIT HYPERACTIVITY DISORDER (ADHD), PREDOMINANTLY INATTENTIVE TYPE: ICD-10-CM

## 2023-04-11 DIAGNOSIS — Z79.899 TRANSGENDER PERSON ON HORMONE THERAPY: ICD-10-CM

## 2023-04-11 PROCEDURE — 99214 OFFICE O/P EST MOD 30 MIN: CPT | Mod: GC | Performed by: PSYCHIATRY & NEUROLOGY

## 2023-04-11 RX ORDER — DEXTROAMPHETAMINE SULFATE 15 MG/1
15 CAPSULE, EXTENDED RELEASE ORAL DAILY
Qty: 90 CAPSULE | Refills: 0 | Status: SHIPPED | OUTPATIENT
Start: 2023-04-11 | End: 2023-04-12

## 2023-04-11 ASSESSMENT — ENCOUNTER SYMPTOMS
AWAKENING FROM SLEEP: 0
PREOCCUPATION: 0
DELUSIONS: 0
COMPULSIONS: 0
INCREASED GOAL-DIRECTED ACTIVITY: 0
HYPERSOMNIA: 0
DIFFICULTY FALLING ASLEEP: 0
DECREASED NEED FOR SLEEP: 0
DECREASED APPETITE: 0
PARANOIA: 0
DECREASED ENERGY: 0
PREMATURE MORNING AWAKENING: 0
INCREASED ENERGY: 0
PANIC: 0
HOPELESSNESS: 0
THOUGHT CONTENT - SHAME: 0

## 2023-04-11 ASSESSMENT — SOCIAL DETERMINANTS OF HEALTH (SDOH): ANXIETY ASSOCIATED WITH SOCIAL SUPPORT SYSTEM: 0

## 2023-04-11 NOTE — PROGRESS NOTES
"PSYCHIATRY FOLLOW-UP NOTE      Name: Jerel Lopez  MRN: [id number]  : 1985  Age: 37 y.o.  PCP: BRYANT Li  Persons in attendance: Patient      REASON FOR VISIT/CHIEF COMPLAINT   Medication follow-up    HISTORY OF PRESENT ILLNESS:  Jerel Lopez is a 37 y.o. old adult with social anxiety disorder, attention and concentration deficits comes in today for follow up. Patient was last seen several months ago.      Patient reports that Vyvanse has been subtherapeutic and not covering symptoms of attention and focus to full therapeutic duration.  Also stating that Vyvanse is wearing off around 2 in the afternoon and leaving them extremely irritable anxious and ending up binging on food.  Patient reports that they have had best success on Dexedrine and was \"life-changing\".  On Dexedrine social anxiety resolved, generalized anxiety resolved, depression improved, attention of focus were maintained and patient able to engage in activities that are meaningful and purposeful.    Still struggling finding source for Dexedrine.  Recommending [employer]    Denies adverse side effects to Dexedrine including insomnia decreased appetite irritability mood instability irritability.      Patient also reports they are exploring potential of surgical transition to female gender.  Requesting evaluation for appropriateness            REVIEW OF SYSTEMS:        PSYCHIATRIC REVIEW OF SYSTEMS:      MOOD SYMPTOMS:   Pertinent negatives - not sad, not irritable and not apathetic      ANXIETY:   Pertinent negatives - no excessive worry, no anxiety and no panic    SLEEP:   Pertinent negatives - no difficulty falling asleep, not awakening from sleep, no premature morning awakening, no decreased need for sleep and no hypersomnia     PSYCHOMOTOR/ENERGY:   Pertinent negatives - no decreased energy and no increased energy    EATING:   Pertinent negatives: no decreased appetite and no increased appetite    COGNITIVE: "   Pertinent positives: concentration moderately impaired    Pertinent negatives: concentration not impaired, no obsessions, no compulsions, no preoccupation, no hopelessness and no shame     BEHAVIOR:   Pertinent negatives - no increased goal-directed activity    PSYCHOSIS:   Pertinent negatives - auditory hallucinations, visual hallucinations, delusions, ideas of reference and paranoia  SOCIAL:   Pertinent negatives - no history of withdrawal symptoms    SENSORY:          Review of Systems   Constitutional:  Negative for decreased appetite.   Psychiatric/Behavioral:  Negative for paranoia.        CURRENT MEDICATIONS:    Current Outpatient Medications:     dextroamphetamine, 15 mg, Oral, DAILY    spironolactone, 50 mg, Oral, BID    estradiol, 4 mg, Oral, DAILY    Cyanocobalamin (VITAMIN B12 PO), Take  by mouth.    VITAMIN D PO, Take  by mouth.      MEDICAL HISTORY  Past Medical History:   Diagnosis Date    Seizure (HCC)     seizure like episodes twice as teenager and early 20s, not evaluated further      Past Surgical History:   Procedure Laterality Date    DENTAL SURGERY       PAST PSYCHIATRIC HISTORY  Prior psychiatric hospitalization: in early 20s for suicidal ideation  Prior Self harm/suicide attempt: denies  Prior Diagnosis: Depression     PAST PSYCHIATRIC MEDICATIONS  Wellbutrin - states benefit took for a few months until he lost insurance    Patient states he has tried two SSRI medications previously but felt these were numbing   Duloxetine -      PHYSICAL EXAMINAION:  Vital signs: There were no vitals taken for this visit.  Musculoskeletal: Normal gait.   Abnormal movements: None noted      MENTAL STATUS EXAMINATION          General:   - Grooming and hygiene: Casual  - Apparent distress: NAD   - Behavior: Calm  - Eye Contact:  Poor  - no psychomotor agitation or retardation   - Participation: Active verbal participation, Attentive, Engaged, and Open to feedback  Orientation:Alert and Fully Oriented to  "person, place and time  Mood: \"I am doing pretty well\"  Affect: Flexible, Full range, Congruent with content, and Congruent to stated mood  Thought Process: Linear, Logical, and Goal-directed  Thought Content: Within normal limits  Perception: Denies auditory or visual hallucinations. No delusions noted Within normal limits  Attention span and concentration: Intact   Speech:Clear with normal rate and rhythm  Language: Appropriate spontaneous, comprehends spoken commands, and fluent  Insight: Good  Judgment:  Good  Recent and remote memory: No gross evidence of memory deficits          DEPRESSION SCREENING:      3/22/2022    10:30 AM 4/20/2022     2:00 PM 5/20/2022    10:30 AM   Depression Screen (PHQ-2/PHQ-9)   PHQ-2 Total Score 3 5 1   PHQ-9 Total Score 15 14 4       ANXIETY SCREENING:      3/22/2022    11:11 AM 4/20/2022     4:22 PM 5/20/2022    10:37 AM   CARLOS 7   CARLOS-7 Total Score 3 2 1       STOPBANG SCORE:  No data recorded      CURRENT RISK:  Suicidal: Low  Homicidal: Low  Self-Harm: Low  Relapse: Low  Crisis Safety Plan Reviewed: Not Indicated      MEDICAL RECORDS/LABS/DIAGNOSTIC TESTS REVIEWED:  Hospital Outpatient Visit on 01/20/2022   Component Date Value Ref Range Status    Cortisol 01/20/2022 6.5  0.0 - 23.0 ug/dL Final    Testosterone,Total 01/20/2022 387  300 - 1080 ng/dL Final    Sex Hormone Bind Globulin 01/20/2022 26  11 - 80 nmol/L Final    Free Testosterone 01/20/2022 80  47 - 244 pg/mL Final    Testosterone % Free 01/20/2022 2.1  1.6 - 2.9 % Final    C. trachomatis by PCR 01/20/2022 Negative  Negative Final    N. gonorrhoeae by PCR 01/20/2022 Negative  Negative Final    Source 01/20/2022 Urine   Final    HIV Ag/Ab Combo Assay 01/20/2022 Non-Reactive  Non Reactive Final    Syphilis, Treponemal Qual 01/20/2022 Non-Reactive  Non-Reactive Final    25-Hydroxy   Vitamin D 25 01/20/2022 32  30 - 100 ng/mL Final    TSH 01/20/2022 1.700  0.380 - 5.330 uIU/mL Final    Cholesterol,Tot 01/20/2022 207 (H)  " 100 - 199 mg/dL Final    Triglycerides 01/20/2022 109  0 - 149 mg/dL Final    HDL 01/20/2022 60  >=40 mg/dL Final    LDL 01/20/2022 125 (H)  <100 mg/dL Final    Glycohemoglobin 01/20/2022 5.2  4.0 - 5.6 % Final    Est Avg Glucose 01/20/2022 103  mg/dL Final    Sodium 01/20/2022 134 (L)  135 - 145 mmol/L Final    Potassium 01/20/2022 4.7  3.6 - 5.5 mmol/L Final    Chloride 01/20/2022 99  96 - 112 mmol/L Final    Co2 01/20/2022 25  20 - 33 mmol/L Final    Anion Gap 01/20/2022 10.0  7.0 - 16.0 Final    Glucose 01/20/2022 87  65 - 99 mg/dL Final    Bun 01/20/2022 8  8 - 22 mg/dL Final    Creatinine 01/20/2022 0.66  0.50 - 1.40 mg/dL Final    Calcium 01/20/2022 9.5  8.5 - 10.5 mg/dL Final    AST(SGOT) 01/20/2022 16  12 - 45 U/L Final    ALT(SGPT) 01/20/2022 26  2 - 50 U/L Final    Alkaline Phosphatase 01/20/2022 114 (H)  30 - 99 U/L Final    Total Bilirubin 01/20/2022 0.8  0.1 - 1.5 mg/dL Final    Albumin 01/20/2022 4.6  3.2 - 4.9 g/dL Final    Total Protein 01/20/2022 7.2  6.0 - 8.2 g/dL Final    Globulin 01/20/2022 2.6  1.9 - 3.5 g/dL Final    A-G Ratio 01/20/2022 1.8  g/dL Final    WBC 01/20/2022 8.0  4.8 - 10.8 K/uL Final    RBC 01/20/2022 5.11  4.70 - 6.10 M/uL Final    Hemoglobin 01/20/2022 15.0  14.0 - 18.0 g/dL Final    Hematocrit 01/20/2022 44.4  42.0 - 52.0 % Final    MCV 01/20/2022 86.9  81.4 - 97.8 fL Final    MCH 01/20/2022 29.4  27.0 - 33.0 pg Final    MCHC 01/20/2022 33.8  33.7 - 35.3 g/dL Final    RDW 01/20/2022 38.6  35.9 - 50.0 fL Final    Platelet Count 01/20/2022 314  164 - 446 K/uL Final    MPV 01/20/2022 10.6  9.0 - 12.9 fL Final    Neutrophils-Polys 01/20/2022 53.30  44.00 - 72.00 % Final    Lymphocytes 01/20/2022 36.20  22.00 - 41.00 % Final    Monocytes 01/20/2022 8.10  0.00 - 13.40 % Final    Eosinophils 01/20/2022 1.80  0.00 - 6.90 % Final    Basophils 01/20/2022 0.50  0.00 - 1.80 % Final    Immature Granulocytes 01/20/2022 0.10  0.00 - 0.90 % Final    Nucleated RBC 01/20/2022 0.00  /100 WBC  Final    Neutrophils (Absolute) 01/20/2022 4.24  1.82 - 7.42 K/uL Final    Lymphs (Absolute) 01/20/2022 2.88  1.00 - 4.80 K/uL Final    Monos (Absolute) 01/20/2022 0.64  0.00 - 0.85 K/uL Final    Eos (Absolute) 01/20/2022 0.14  0.00 - 0.51 K/uL Final    Baso (Absolute) 01/20/2022 0.04  0.00 - 0.12 K/uL Final    Immature Granulocytes (abs) 01/20/2022 0.01  0.00 - 0.11 K/uL Final    NRBC (Absolute) 01/20/2022 0.00  K/uL Final    Fasting Status 01/20/2022 Fasting   Final    GFR If  01/20/2022 >60  >60 mL/min/1.73 m 2 Final    GFR If Non  01/20/2022 >60  >60 mL/min/1.73 m 2 Final             [place]  records -   Reviewed     ===================================================================    ASSESSMENT  Patient unable to tolerate Vyvanse.  Vyvanse subtherapeutic and significant adverse side effects.  Given history, Dexedrine has been most therapeutic with least side effects.  However given shortage of stimulants difficult to prescribe.  Recommending [employer] if insurance will cover.  Mood symptoms appear to be secondary to ADHD as anxiety depression and social anxiety have overall resolved with appropriate stimulant treatment.      DIAGNOSES  1. Attention deficit hyperactivity disorder (ADHD), predominantly inattentive type            PLAN:      Education:    Psychotherapy: Continue with current psychotherapist   Labs/studies: None     Medications:  Discontinue Vyvanse due to adverse side effects and ineffective  INITIATE Dexedrine 15 mg extended release 90-day supply   Other: Great 1 hour follow-up for thorough evaluation concerning gender dysphoria and surgical transition         Medication options, alternatives (including no medications) and medication risks/benefits/side effects were discussed in detail.  Explained importance of contraceptive measures while on psychotropic medications, educated to let provider know if ever pregnant or wanting to become pregnant. Verbalized  understanding.  The patient was advised to call, message provider on [employer], or come in to the clinic if symptoms worsen or if any future questions/issues regarding their medications arise; the patient verbalized understanding and agreement.    The patient was educated to call 911, call the suicide hotline, or go to local ER if having thoughts of suicide or homicide; verbalized understanding.        Billing Coding based on:  59061 based on MDM  10278: based on psychotherapy timing  76556: based on total time spent of 40 min in evaluation, charting and file review.     Return to clinic in next available 1 hour appointment or sooner if symptoms worsen.  Next Appointment: instruction provided on how to make the next appointment.     The proposed treatment plan was discussed with the patient who was provided the opportunity to ask questions and make suggestions regarding alternative treatment. Patient verbalized understanding and expressed agreement with the plan.       Prosper Olmstead D.O.      This note was created using voice recognition software (Dragon). The accuracy of the dictation is limited by the abilities of the software. I have reviewed the note prior to signing, however some errors in grammar and context are still possible. If you have any questions related to this note please do not hesitate to contact our office.

## 2023-04-12 DIAGNOSIS — F90.0 ATTENTION DEFICIT HYPERACTIVITY DISORDER (ADHD), PREDOMINANTLY INATTENTIVE TYPE: ICD-10-CM

## 2023-04-12 RX ORDER — DEXTROAMPHETAMINE SULFATE 5 MG/1
15 CAPSULE, EXTENDED RELEASE ORAL DAILY
Qty: 90 CAPSULE | Refills: 0 | Status: SHIPPED
Start: 2023-04-12 | End: 2023-04-14

## 2023-04-14 DIAGNOSIS — F90.0 ATTENTION DEFICIT HYPERACTIVITY DISORDER (ADHD), PREDOMINANTLY INATTENTIVE TYPE: ICD-10-CM

## 2023-04-14 RX ORDER — DEXTROAMPHETAMINE SULFATE 5 MG/1
15 CAPSULE, EXTENDED RELEASE ORAL DAILY
Qty: 90 CAPSULE | Refills: 0 | Status: SHIPPED | OUTPATIENT
Start: 2023-04-14 | End: 2023-05-14

## 2023-04-25 ENCOUNTER — SPEECH THERAPY (OUTPATIENT)
Dept: SPEECH THERAPY | Facility: OTHER | Age: 38
End: 2023-04-25
Payer: COMMERCIAL

## 2023-04-25 DIAGNOSIS — R49.8 OTHER VOICE AND RESONANCE DISORDERS: ICD-10-CM

## 2023-04-25 PROCEDURE — 92507 TX SP LANG VOICE COMM INDIV: CPT | Mod: GN,GC | Performed by: SPEECH-LANGUAGE PATHOLOGIST

## 2023-04-25 NOTE — OP THERAPY DAILY TREATMENT
"  Outpatient Speech Therapy  DAILY TREATMENT     [facility] SPEECH PATHOLOGY AND AUDIOLOGY  [address]  [place] [address]  Phone:  [phone]  Fax:  [phone]    Date: 04/25/2023    Patient: Janneth Mariano  YOB: 1985  MRN: [id number]     Time Calculation    Start time: 1500  Stop time: 1600 Time Calculation (min): 60 minutes         Chief Complaint: Speech Therapy    Visit #: 9/12    Subjective Evaluation    Janneth arrived to the session on time and independently. They brought their own water bottle to drink throughout the session. They also mentioned feeling slight stress due to finals coming up and needing to prepare for end of semester coursework. They appeared to be in a good mood during the session and shared concerns about not feeling that therapy has been targeting the aspects of their voice that they would like.      The Mid-Program Check-In: Reviewing Vocal Dynamics was implemented during today's session in order to embody ideal identity, review warm-up preparation exercises, review the vocal recipe components, create a coordinated voice, and complete new self-assessments for ideal voice and informed level of commitment.    Speech Therapy Objective     Review Assigned Homework:     Janneth reported that they did not complete their assigned homework because they lost the sheet. They reported that they have been practicing their pitch by talking to other people. They also reported using Voice Tools on their own to measure pitch. They also stated they were practicing other vocal exercises they found online called \"big dog little dog\" and \"whisper siren.\" They stated it is hard to work on breathiness and putting different aspects of the voice into one comfortable voice.     Short Term Goal 1: Vocal Relaxation Exercises   See data under Short Term Goal 3.     Short Term Goal 2: Breath Support Exercises   See data under Short Term Goal 3.     Short Term Goal 3: Increase Speaking " Fundamental Frequency      Embody Ideal Identity    Janneth visualized their ideal physical body based on their SM4: Ideal Identity Profile from Pre-session part 2. They closed their eyes, imagined the characteristics of their ideal identity, and practiced a voiced sigh. This visualization experience was discussed with Janneth.      Discuss Ideal Physical Body    Janneth and the clinician discussed the visualization experience. When they were asked, “What were the qualities of your voiced sigh?” Janneth reported that it went from loud to less loud, high to low pitch, and was breathy with an h-sound. When they were asked, “Where did you feel tension or relaxation?”, they reported feeling tension when they inhaled and relaxed during the exhalation.    Janneth visualized their new voice with a new neutral voice by breathing and producing voiced sigh. They used this neutral sigh as a “homebase” to check in throughout the session.      Warm-Up  A warm-up was conducted to prepare voice and body for voice recipe exploration. The client did the following warm up:      Stretch (Make yourself as big as you can): Janneth stood up from their chair, stretched their arms out wide and made facial expressions (puckering lips, puffing lips) to stretch their face.    Scrunch (Make yourself as small as you can and scrunch your face): Janneth squatted down low and made themselves small and scrunched their face.    Yawn “Yawn-Slide from High to Low” and “Yawn-Slide from Low to High”: Janneth demonstrated produced both of those exercises with clinician model.    Face stretch (“Stretch your face in various directions”): Janneth gave themselves a facial massage to warm up their face.    Speech Warm-Up (Bilabial trills with different pitches and babbling): Janneth produced lip trills and some babbling.     Home Base Check-In (breathing in and exhaling a voiced sigh): Janneth played with their voice while trying to establish a neutral voiced sigh.         Voice  Recipe Review     Tempo/Rate of Speech   a. Speak fast-Vivid spoke fast while talking about art.    b. Speak slowly- Vivid elongated their vowels in a sentence to slow down their speech.    c. Sigh with neutral voice: Janneth sighed with a high pitch.       Rhythm/Prosody   a. Use upspeak, or rising intonation. - Vivid asked a question with rising intonation.   b. Use downspeak, or falling intonation. - Vivid said a sentence and used falling intonation.    c. Use random patterns. - Vivid practiced saying words at the beginning of a sentence slowly then said the rest of the sentence quickly.    d. Chant on one pitch. - Janneth used the sentence “Meet me Peter” to speak in a monotone way.    e. Sigh with neutral voice. - Beckiid sighed with an “ooh.”      Articulation (precise/imprecise)   a. Focus on saying all final consonants. -Vivid exaggerated final consonants in the sentence “I want a diet coke.”    b. Focus on saying only vowels.- Vivid exaggerated vowels by saying “How you doing?”    c. Sigh with neutral voice.- Beckiid sighed with several high and low pitches.       Pronunciation (regionalism/generality)   a. Speak as a character with an accent. - Janneth tried speaking in a [place] accent while saying a sentence.   b. Give the character a specific attitude.- Beckiid stated they did not like this prompt and asked to skip it.      Pitch (high/low)   a. Speak in your neutral voice. - Vivid spoke in their neutral voice with slight oral-forward resonance   b. Speak with a pitch much higher than your neutral pitch. - Vivid spoke in a higher pitch with more oral forward resonance.   c. Speak with a pitch much lower than your neutral pitch - Beckiid spoke in a low pitch in a sentence with chest resonance.    d. Sigh with a neutral voice.- Vivid sighed in neutral pitch.       Volume/Intensity (loud/soft)   a. Speak loudly- Vivid pretended to yell at someone from across the room.    b. Speak softly. - Beckiid whispered to the  clinician.    c. Sigh with a neutral voice.- Janneth sighed in neutral pitch.      Quality (resonance: nasal/chest; breathiness: hard contact/soft contact)    a. Speak with nasality. - Janneth practiced being nasal like the TV character “Randell Jordan.”    b. Speak with chest resonance. - Janneth used chest resonance and stated they rarely used that type of resonance since they began speech therapy.   c. Speak with hard contact. - Janneth used hard contact by saying “What is your problem?” in a harsh tone.   d. Speak with soft contact. - Janneth used soft contact by using breathiness in a sentence.    e. Sigh with neutral voice.- Janneth sighed with an “ooh” and low pitch.      Word Choice (descriptive/concise)     a. Use descriptive vocabulary. - Beckiid used adverbs and adjectives to elongate their sentence.    b. Combine descriptive vocabulary with other voice ingredients. - Janneth used a melodic tone while saying a long sentence.   c. Use concise vocabulary with other voice ingredients.- Janneth said a short sentence with a monotone voice.    d. Sigh with neutral voice.- Beckiid sighed with a neutral voice.     Nonverbal Communication    Vivid expressed that they felt uncomfortable working on nonverbal communication. The clinician expressed the importance of nonverbal communication and how it is heavily integrated into verbal communication. The clinician gave them the option to skip this section and Janneth chose to do so.        Vocal Coordination   Janneth practiced joining three vocal elements together to create a coordinated voice. They chose to speak at a fast rate, melodic rhythm, and imprecise articulation. The clinician had Beckiid record themselves and listen and rate how they sounded.    Self Assessments    Beckiid determined their ideal voice by using the SM5: Visual Analog for Ideal Voice document as follows:     1. What is your average rate of speech: mostly fast   2. How would you describe your rhythm/prosody: mostly melodic    3. How would you describe your average speaking pitch: neutral   4. How would you describe your average volume/intensity: neutral   5. How would you describe your resonance: more nasal   6. How would you describe your breathiness: more hard contact   7. How would you describe your articulation: neutral   8. How would you describe your vocabulary: mostly descriptive   9. How would you describe your nonverbal gestures: neutral, slightly fluid   10. How would you describe your nonverbal facial expressions: mostly animated      Describe your willingness to adjust your habitual vocal use in order to achieve your ideal voice: Not answered.    Describe any challenges you may have adjusting your habitual vocal use: Not answered.         Vivid rated their level of commitment to the program process out of 100% using SM6 Visual Analog Scale (VAS) for Level of Commitment as follows:     1. How committed are you to practicing at home: 90%   2. How committed are you to making up missed sessions: 85%   3. How committed are you to exploring your full vocal range: 100%   4. How committed are you to letting go of your current voice: 100%   5. How committed are you to fully transitionin%   6. How important is it for you to transition: 100%   7. How motivated are you to engage in personal practice beyond guided sessions: 100%   8. How confident are you that you can make this transition: 100%   9. How supported by friends and family do you feel in making this transition: 98%   10. How confident are you about overcoming difficult tasks during your trainin%     Describe the factors that prevent you from being less committed: Not answered     Describe the factors preventing you from being more committed: Not answered      Set Goals   Vivid set goals for themselves to practice before returning two the next two sessions. They wrote one goal about wanting to sound like their current voice model. They did not write their second  goal during the session due to time running out.     Homework  Janneth was assigned the Independent Practice Homework Packet to complete during the transition of treatment periods.     Short Term Goal 4: Oral-Forward Resonance   See data under Short Term Goal 3.     Short Term Goal 5: Feminine Verbal Aspects of Communication    See data under Short Term Goal 3.     Short Term Goal 6: Feminine Nonverbal Aspects of Communication    See data under Short Term Goal 3.     Short Term Goal 7: Vocal Quality and Vocal Hygiene    See data under Short Term Goal 3.        Assessments  Short Term Goal 3: Increase Speaking Fundamental Frequency      Embody Ideal Identity    Janneth closed their eyes to identify their ideal identify. They laughed at first and had a hard time focusing on what they wanted to visualize. Then they took a deep breath and practiced a neutral sigh. They asked if they could play with their sigh and see what they liked and the clinician encouraged it. Janneth used different types of voiced sighs throughout the session.     Discuss Ideal Physical Body    Janneth easily produced a voiced sigh and had a hard time developing a neutral sigh. They stated it was because they rarely sigh in their day to day life. Janneth could identify aspects of the sigh and identify were they felt relaxation and tension.     Warm-Up  Janneth easily did the following warm up exercises stretch, scrunch, yawn slide, face stretch, speech warm up, and home base check in easily without clinician model.      Voice Recipe Review   Janneth easily demonstrated flexibility in the verbal aspects of communication. They participated in reviewing the voice components and made up their own prompts or used prompt the clinician gave them. They appeared comfortable practicing the verbal exercises.     Janneth stated they wanted to skip the nonverbal aspects of the voice recipe due to feeling uncomfortable for them to practice. The clinician gave them the option  to skip and they took it.     Vocal Coordination   Janneth practiced speaking in a fast rate, melodic rhythm, and imprecise articulation since those are the vocal elements they want to emphasized in their ideal voice. The clinician had Janneth record themselves and listen and rate how they sounded. They stated they did not like how the vowels sounded in their speech. After listening to the recording, they said they said they spoke fast and imprecise but not melodic. The clinician stated they sounded fast and melodic but not precise.     Self Assessments   Janneth compared their previous SM5:Visual Analog for Ideal Voice document to the newly completed document. They noted that wanted to sound more nasal, have more hard contact, use less fluid gestures, and have less animated gestures than they previously documented.They stated wanting to minimize the fluidity in their nonverbal gestures because being too fluid feels too rigid in itself. They also practice more hard contact because they do not want to practice breathiness.     Janneth compared their previous SM6: Visual Analog Scale (VAS) for Level of Commitment to a newly completed scale. They noticed a decrease in commitment to practicing at home and making up missed sessions. Vivid stated that they felt a decrease in their overall commitment to the program because they feel that they are unsure about the program and not sure when their voice is going to come together. Janneth expressed that the program has not targeted the aspects of their voice they do not like. The clinician asked Janneth if they would share what about heir voice they would like to change and stated they did not know. Janneth said that the program is not following the same structure that other prominent transgender voice coaches are using in their own programs and wanting to diversify the information they receive to change their voice. They also stated feeling obligated to come to session because of jovanna  "money the [facility] is using to cover their visits.   Janneth stated they a less committed to transitioning because they feel that they are always \"influx\" and changing as a person.     Set Goals   Janneth wrote one goal for themselves to sound like their current voice model. The clinician asked Janneth what aspects of their voice model they liked and said they did not want to understand the aspects of their voice that they like but rather just practice sounding like their voice model. They did not write their second goal during the session due to time running out.     Homework  Janneth asked questions about their newly assigned homework. The clinician explained there is enough homework for 4 weeks of practice that they can use between the spring and summer treatment periods.       Speech Therapy Plan  -Review the goals Janneth wrote for their homework   -Review homework with Janneth   - Implement Session 7 of the  Program       [x] As the licensed therapist supervising this student, I was present during the entire treatment session directing the care and reviewing the assessment plan.  I reviewed all documentation prior to signing.               "

## 2023-04-28 ENCOUNTER — OFFICE VISIT (OUTPATIENT)
Dept: BEHAVIORAL HEALTH | Facility: CLINIC | Age: 38
End: 2023-04-28
Payer: COMMERCIAL

## 2023-04-28 ENCOUNTER — DOCUMENTATION (OUTPATIENT)
Dept: BEHAVIORAL HEALTH | Facility: CLINIC | Age: 38
End: 2023-04-28
Payer: COMMERCIAL

## 2023-04-28 DIAGNOSIS — R41.840 ATTENTION AND CONCENTRATION DEFICIT: ICD-10-CM

## 2023-04-28 DIAGNOSIS — F64.9 GENDER DYSPHORIA: ICD-10-CM

## 2023-04-28 PROCEDURE — 99214 OFFICE O/P EST MOD 30 MIN: CPT | Mod: CG | Performed by: PSYCHIATRY & NEUROLOGY

## 2023-04-28 RX ORDER — DEXTROAMPHETAMINE SULFATE 15 MG/1
15 CAPSULE, EXTENDED RELEASE ORAL DAILY
Qty: 90 CAPSULE | Refills: 0 | Status: SHIPPED | OUTPATIENT
Start: 2023-04-28 | End: 2023-05-28

## 2023-04-28 ASSESSMENT — ENCOUNTER SYMPTOMS
PREOCCUPATION: 0
DELUSIONS: 0
HYPERSOMNIA: 0
HOPELESSNESS: 0
AWAKENING FROM SLEEP: 0
DECREASED NEED FOR SLEEP: 0
COMPULSIONS: 0
INCREASED ENERGY: 0
PANIC: 0
PARANOIA: 0
DIFFICULTY FALLING ASLEEP: 0
PREMATURE MORNING AWAKENING: 0
DECREASED APPETITE: 0
INCREASED GOAL-DIRECTED ACTIVITY: 0
THOUGHT CONTENT - SHAME: 0
DECREASED ENERGY: 0

## 2023-04-28 ASSESSMENT — SOCIAL DETERMINANTS OF HEALTH (SDOH): ANXIETY ASSOCIATED WITH SOCIAL SUPPORT SYSTEM: 0

## 2023-04-28 NOTE — PROGRESS NOTES
"PSYCHIATRY FOLLOW-UP NOTE      Name: Jerel Lopez  MRN: [id number]  : 1985  Age: 37 y.o.  PCP: BRYANT Li  Persons in attendance: Patient      REASON FOR VISIT/CHIEF COMPLAINT   Medication follow-up    HISTORY OF PRESENT ILLNESS:  Jerel Lopez is a 37 y.o. old adult with social anxiety disorder, attention and concentration deficits comes in today for for follow-up on formal assessment regarding concerns of gender dysphoria    Patient reports that since childhood they have struggled with masculine portions of their gender identity.  Reports they have consistently rejected the notion of their masculinity and if consistently taking steps to avoid aspects of this identity.  Patient reports consistent distress when engendered as male which is permeated into repression of their self identity and impairment in social and professional aspects of their life including but not limited to exacerbation of social anxiety disorder and establishment of social connections.  Patient reports that as they became more knowledgeable of gender fluidity, gradually became to identify as \"gender queer\" with persistent rejection of masculinity.  Reports her gender identity aligns more on \"alt fem\".      They have consistently taken steps to confirm their gender identity.  They have begun hormone replacement therapy almost 1 year ago, are engaging in voice lessons, and have persistently changed style of gender expression to avoid masculine aspects of the life.  Patient has researched thoroughly risks benefits and advantages of taking gender affirming surgical interventions, in this case an orchiectomy.  Patient has substantially researched alternative surgical interventions including vaginoplasty and breast augmentation.  However they have weighed the risks and benefits and deemed that at this time the complications and risks do not warrant the benefits.  However, given the low risk of complications with an " orchiectomy and added benefits of permanently solidifying reduction of testosterone as well as limiting their need for additional hormone replacement therapy (which comes with substantial side effects in itself), patient is electing to proceed with only an orchiectomy.      Patient does report that significant distress does come with being misgendered as male since childhood.  Reports that this has been substantial because too many of their tendencies to repressed their true identity.  Consequently this is permeated into adverse psychosocial and professional complications including but not limited to exacerbation of social anxiety disorder depression anxiety suicidality.    With hormone replacement therapy, patient has reported a reduction of adverse psychological symptoms.    Patient currently denying suicidality      REVIEW OF SYSTEMS:        PSYCHIATRIC REVIEW OF SYSTEMS:      MOOD SYMPTOMS:   Pertinent negatives - not sad, not irritable and not apathetic      ANXIETY:   Pertinent negatives - no excessive worry, no anxiety and no panic    SLEEP:   Pertinent negatives - no difficulty falling asleep, not awakening from sleep, no premature morning awakening, no decreased need for sleep and no hypersomnia     PSYCHOMOTOR/ENERGY:   Pertinent negatives - no decreased energy and no increased energy    EATING:   Pertinent negatives: no decreased appetite and no increased appetite    COGNITIVE:   Pertinent positives: concentration moderately impaired    Pertinent negatives: concentration not impaired, no obsessions, no compulsions, no preoccupation, no hopelessness and no shame     BEHAVIOR:   Pertinent negatives - no increased goal-directed activity    PSYCHOSIS:   Pertinent negatives - auditory hallucinations, visual hallucinations, delusions, ideas of reference and paranoia  SOCIAL:   Pertinent negatives - no history of withdrawal symptoms    SENSORY:          Review of Systems   Constitutional:  Negative for decreased  "appetite.   Psychiatric/Behavioral:  Negative for paranoia.        CURRENT MEDICATIONS:    Current Outpatient Medications:     dextroamphetamine, 15 mg, Oral, DAILY    dextroamphetamine, 15 mg, Oral, DAILY    spironolactone, 50 mg, Oral, BID    estradiol, 4 mg, Oral, DAILY    Cyanocobalamin (VITAMIN B12 PO), Take  by mouth.    VITAMIN D PO, Take  by mouth.      MEDICAL HISTORY  Past Medical History:   Diagnosis Date    Seizure (HCC)     seizure like episodes twice as teenager and early 20s, not evaluated further      Past Surgical History:   Procedure Laterality Date    DENTAL SURGERY       PAST PSYCHIATRIC HISTORY  Prior psychiatric hospitalization: in early 20s for suicidal ideation  Prior Self harm/suicide attempt: denies  Prior Diagnosis: Depression     PAST PSYCHIATRIC MEDICATIONS  Wellbutrin - states benefit took for a few months until he lost insurance    Patient states he has tried two SSRI medications previously but felt these were numbing   Duloxetine -      PHYSICAL EXAMINAION:  Vital signs: There were no vitals taken for this visit.  Musculoskeletal: Normal gait.   Abnormal movements: None noted      MENTAL STATUS EXAMINATION          General:   - Grooming and hygiene: Casual  - Apparent distress: NAD   - Behavior: Calm  - Eye Contact:  Poor  - no psychomotor agitation or retardation   - Participation: Active verbal participation, Attentive, Engaged, and Open to feedback  Orientation:Alert and Fully Oriented to person, place and time  Mood: \"I am doing pretty well\"  Affect: Flexible, Full range, Congruent with content, and Congruent to stated mood  Thought Process: Linear, Logical, and Goal-directed  Thought Content: Within normal limits  Perception: Denies auditory or visual hallucinations. No delusions noted Within normal limits  Attention span and concentration: Intact   Speech:Clear with normal rate and rhythm  Language: Appropriate spontaneous, comprehends spoken commands, and fluent  Insight: " Good  Judgment:  Good  Recent and remote memory: No gross evidence of memory deficits          DEPRESSION SCREENING:      3/22/2022    10:30 AM 4/20/2022     2:00 PM 5/20/2022    10:30 AM   Depression Screen (PHQ-2/PHQ-9)   PHQ-2 Total Score 3 5 1   PHQ-9 Total Score 15 14 4       ANXIETY SCREENING:      3/22/2022    11:11 AM 4/20/2022     4:22 PM 5/20/2022    10:37 AM   CARLOS 7   CARLOS-7 Total Score 3 2 1       STOPBANG SCORE:  No data recorded      CURRENT RISK:  Suicidal: Low  Homicidal: Low  Self-Harm: Low  Relapse: Low  Crisis Safety Plan Reviewed: Not Indicated      MEDICAL RECORDS/LABS/DIAGNOSTIC TESTS REVIEWED:  Hospital Outpatient Visit on 01/20/2022   Component Date Value Ref Range Status    Cortisol 01/20/2022 6.5  0.0 - 23.0 ug/dL Final    Testosterone,Total 01/20/2022 387  300 - 1080 ng/dL Final    Sex Hormone Bind Globulin 01/20/2022 26  11 - 80 nmol/L Final    Free Testosterone 01/20/2022 80  47 - 244 pg/mL Final    Testosterone % Free 01/20/2022 2.1  1.6 - 2.9 % Final    C. trachomatis by PCR 01/20/2022 Negative  Negative Final    N. gonorrhoeae by PCR 01/20/2022 Negative  Negative Final    Source 01/20/2022 Urine   Final    HIV Ag/Ab Combo Assay 01/20/2022 Non-Reactive  Non Reactive Final    Syphilis, Treponemal Qual 01/20/2022 Non-Reactive  Non-Reactive Final    25-Hydroxy   Vitamin D 25 01/20/2022 32  30 - 100 ng/mL Final    TSH 01/20/2022 1.700  0.380 - 5.330 uIU/mL Final    Cholesterol,Tot 01/20/2022 207 (H)  100 - 199 mg/dL Final    Triglycerides 01/20/2022 109  0 - 149 mg/dL Final    HDL 01/20/2022 60  >=40 mg/dL Final    LDL 01/20/2022 125 (H)  <100 mg/dL Final    Glycohemoglobin 01/20/2022 5.2  4.0 - 5.6 % Final    Est Avg Glucose 01/20/2022 103  mg/dL Final    Sodium 01/20/2022 134 (L)  135 - 145 mmol/L Final    Potassium 01/20/2022 4.7  3.6 - 5.5 mmol/L Final    Chloride 01/20/2022 99  96 - 112 mmol/L Final    Co2 01/20/2022 25  20 - 33 mmol/L Final    Anion Gap 01/20/2022 10.0  7.0 - 16.0  Final    Glucose 01/20/2022 87  65 - 99 mg/dL Final    Bun 01/20/2022 8  8 - 22 mg/dL Final    Creatinine 01/20/2022 0.66  0.50 - 1.40 mg/dL Final    Calcium 01/20/2022 9.5  8.5 - 10.5 mg/dL Final    AST(SGOT) 01/20/2022 16  12 - 45 U/L Final    ALT(SGPT) 01/20/2022 26  2 - 50 U/L Final    Alkaline Phosphatase 01/20/2022 114 (H)  30 - 99 U/L Final    Total Bilirubin 01/20/2022 0.8  0.1 - 1.5 mg/dL Final    Albumin 01/20/2022 4.6  3.2 - 4.9 g/dL Final    Total Protein 01/20/2022 7.2  6.0 - 8.2 g/dL Final    Globulin 01/20/2022 2.6  1.9 - 3.5 g/dL Final    A-G Ratio 01/20/2022 1.8  g/dL Final    WBC 01/20/2022 8.0  4.8 - 10.8 K/uL Final    RBC 01/20/2022 5.11  4.70 - 6.10 M/uL Final    Hemoglobin 01/20/2022 15.0  14.0 - 18.0 g/dL Final    Hematocrit 01/20/2022 44.4  42.0 - 52.0 % Final    MCV 01/20/2022 86.9  81.4 - 97.8 fL Final    MCH 01/20/2022 29.4  27.0 - 33.0 pg Final    MCHC 01/20/2022 33.8  33.7 - 35.3 g/dL Final    RDW 01/20/2022 38.6  35.9 - 50.0 fL Final    Platelet Count 01/20/2022 314  164 - 446 K/uL Final    MPV 01/20/2022 10.6  9.0 - 12.9 fL Final    Neutrophils-Polys 01/20/2022 53.30  44.00 - 72.00 % Final    Lymphocytes 01/20/2022 36.20  22.00 - 41.00 % Final    Monocytes 01/20/2022 8.10  0.00 - 13.40 % Final    Eosinophils 01/20/2022 1.80  0.00 - 6.90 % Final    Basophils 01/20/2022 0.50  0.00 - 1.80 % Final    Immature Granulocytes 01/20/2022 0.10  0.00 - 0.90 % Final    Nucleated RBC 01/20/2022 0.00  /100 WBC Final    Neutrophils (Absolute) 01/20/2022 4.24  1.82 - 7.42 K/uL Final    Lymphs (Absolute) 01/20/2022 2.88  1.00 - 4.80 K/uL Final    Monos (Absolute) 01/20/2022 0.64  0.00 - 0.85 K/uL Final    Eos (Absolute) 01/20/2022 0.14  0.00 - 0.51 K/uL Final    Baso (Absolute) 01/20/2022 0.04  0.00 - 0.12 K/uL Final    Immature Granulocytes (abs) 01/20/2022 0.01  0.00 - 0.11 K/uL Final    NRBC (Absolute) 01/20/2022 0.00  K/uL Final    Fasting Status 01/20/2022 Fasting   Final    GFR If   "American 01/20/2022 >60  >60 mL/min/1.73 m 2 Final    GFR If Non  01/20/2022 >60  >60 mL/min/1.73 m 2 Final             [facility]  records -   Reviewed     ===================================================================    ASSESSMENT  At this time, patient does meet criteria for gender dysphoria.  While patient's gender identity does not align with a binary system of gender, it is clear that there is self-described \"queer gendered\" expression of self identity does not align with their assigned sex at birth, which is male.  Patient is taking consistent measures to reject and avoid masculinity since adolescence including but not limited to changing of clothing style, changing of their name, hormone replacement therapy, initiation of voice lessons to modify voice.  Given this consistent trend and the current available evidence, gender affirming care continues to be the evidence-based approach to the reduction of symptoms of gender dysphoria,    Additionally, after thorough evaluation, patient does have capacity to make medical decisions and understands the risks benefits and rewards of an orchiectomy.  Patient has a logical understanding that surgical intervention of only an orchiectomy will permanently reduce the overall testosterone and masculinizing effects on the body for long-term.      DIAGNOSES  1. Attention and concentration deficit    2. Gender dysphoria            PLAN:      Education: Discussed risks benefits of orchiectomy   Psychotherapy: Continue with current psychotherapist   Labs/studies: None     Medications:    Refill Dexedrine 15 mg extended release 90-day supply   Other: Provided letter to substantiate appropriateness of gender affirming care         Medication options, alternatives (including no medications) and medication risks/benefits/side effects were discussed in detail.  Explained importance of contraceptive measures while on psychotropic medications, educated to let provider " know if ever pregnant or wanting to become pregnant. Verbalized understanding.  The patient was advised to call, message provider on MyChart, or come in to the clinic if symptoms worsen or if any future questions/issues regarding their medications arise; the patient verbalized understanding and agreement.    The patient was educated to call 911, call the suicide hotline, or go to local ER if having thoughts of suicide or homicide; verbalized understanding.        Billing Coding based on:  54126 based on MDM  13768: based on psychotherapy timing  91517: based on total time spent of 40 min in evaluation, charting and file review.     Return to clinic in next available 1 hour appointment or sooner if symptoms worsen.  Next Appointment: instruction provided on how to make the next appointment.     The proposed treatment plan was discussed with the patient who was provided the opportunity to ask questions and make suggestions regarding alternative treatment. Patient verbalized understanding and expressed agreement with the plan.       Prosper Olmstead D.O.      This note was created using voice recognition software (Dragon). The accuracy of the dictation is limited by the abilities of the software. I have reviewed the note prior to signing, however some errors in grammar and context are still possible. If you have any questions related to this note please do not hesitate to contact our office.

## 2023-07-06 DIAGNOSIS — R41.840 ATTENTION AND CONCENTRATION DEFICIT: ICD-10-CM

## 2023-07-06 RX ORDER — DEXTROAMPHETAMINE SULFATE 15 MG/1
15 CAPSULE, EXTENDED RELEASE ORAL DAILY
Qty: 90 CAPSULE | Refills: 0 | OUTPATIENT
Start: 2023-07-06 | End: 2023-08-05

## 2023-07-06 NOTE — TELEPHONE ENCOUNTER
Received request via: Patient    Was the patient seen in the last year in this department? Yes    Does the patient have an active prescription (recently filled or refills available) for medication(s) requested? No    Does the patient have long term Plus and need 100 day supply (blood pressure, diabetes and cholesterol meds only)? Medication is not for cholesterol, blood pressure or diabetes and Patient does not have SCP

## 2023-07-17 ENCOUNTER — OFFICE VISIT (OUTPATIENT)
Dept: BEHAVIORAL HEALTH | Facility: CLINIC | Age: 38
End: 2023-07-17
Payer: COMMERCIAL

## 2023-07-17 VITALS — DIASTOLIC BLOOD PRESSURE: 65 MMHG | HEART RATE: 60 BPM | SYSTOLIC BLOOD PRESSURE: 100 MMHG

## 2023-07-17 DIAGNOSIS — F90.0 ATTENTION DEFICIT HYPERACTIVITY DISORDER (ADHD), PREDOMINANTLY INATTENTIVE TYPE: ICD-10-CM

## 2023-07-17 DIAGNOSIS — F33.42 MDD (MAJOR DEPRESSIVE DISORDER), RECURRENT, IN FULL REMISSION (HCC): ICD-10-CM

## 2023-07-17 PROCEDURE — 90792 PSYCH DIAG EVAL W/MED SRVCS: CPT | Performed by: STUDENT IN AN ORGANIZED HEALTH CARE EDUCATION/TRAINING PROGRAM

## 2023-07-17 PROCEDURE — 3074F SYST BP LT 130 MM HG: CPT | Performed by: STUDENT IN AN ORGANIZED HEALTH CARE EDUCATION/TRAINING PROGRAM

## 2023-07-17 PROCEDURE — 3078F DIAST BP <80 MM HG: CPT | Performed by: STUDENT IN AN ORGANIZED HEALTH CARE EDUCATION/TRAINING PROGRAM

## 2023-07-17 RX ORDER — SUMATRIPTAN 50 MG/1
TABLET, FILM COATED ORAL
COMMUNITY
Start: 2023-06-26

## 2023-07-17 RX ORDER — FINASTERIDE 5 MG/1
TABLET, FILM COATED ORAL
COMMUNITY
Start: 2023-07-06

## 2023-07-17 RX ORDER — ESTRADIOL 2 MG/1
TABLET ORAL
COMMUNITY
Start: 2023-03-15

## 2023-07-17 RX ORDER — BICALUTAMIDE 50 MG/1
TABLET, FILM COATED ORAL
COMMUNITY
Start: 2023-07-06

## 2023-07-17 RX ORDER — PROGESTERONE 100 MG/1
CAPSULE ORAL
COMMUNITY
Start: 2023-05-04

## 2023-07-17 RX ORDER — DEXTROAMPHETAMINE SULFATE 15 MG/1
15 CAPSULE, EXTENDED RELEASE ORAL DAILY
Qty: 90 CAPSULE | Refills: 0 | Status: SHIPPED | OUTPATIENT
Start: 2023-07-29 | End: 2023-10-16 | Stop reason: SDUPTHER

## 2023-07-17 RX ORDER — DEXTROAMPHETAMINE SULFATE 15 MG/1
15 CAPSULE, EXTENDED RELEASE ORAL DAILY
Qty: 90 CAPSULE | Refills: 0 | Status: SHIPPED | OUTPATIENT
Start: 2023-07-17 | End: 2023-07-17 | Stop reason: SDUPTHER

## 2023-07-17 NOTE — PROGRESS NOTES
"INITIAL PSYCHIATRIC EVALUATION      This provider informed the patient their medical records are totally confidential except for the use by other providers involved in their care, or if the patient signs a release, or to report instances of child or elder abuse, or if it is determined they are an immediate risk to harm themselves or others.      CHIEF COMPLAINT  \"Reestablish care and get my meds right\"      SUBJECTIVE  HISTORY OF PRESENT ILLNESS  Janneth Mariano is a 37 y.o. old adult comes in today to establish care and for evaluation of anxiety, depression, and ADHD.  I did review previous outpatient psychiatry follow up notes.     Patient reports history of trouble focusing that has interfered with school in the past.  These symptoms have improved since taking stimulant medications for ADHD.  They have gradually increased the dose of dextroamphetamine and currently takes 15 mg Spansules.  They shared concerns that this medication may lose effectiveness and has wondered about whether a \"booster\" dose in the afternoon may be helpful at some point.  Patient has a history of anxiety in social situations.  They have not taken medication for this but have found that anxiety has improved since starting dextroamphetamine.  They have also become more comfortable and report improved relationships with friends, especially among individuals they meet in gender affirming community and plans to join a group for trans people at [facility] Hopes for community and support.  They deny concerns about recent anxiety.  Patient has experienced depression throughout their life.  Depressed mood became worse and interfered with quality of life during their 20s.  They had been in the  and felt isolated during that time and afterward.  They describe hoping that they would be killed in duty while in the .  They began taking Wellbutrin while in  training and as their mood improved, they decided to leave the army.  " "Since then, there motivation to follow goals and their ability to express himself has significantly improved and mood has recently been \"pretty good.\"  They report history of excessive alcohol use during this time.  They were hospitalized once following a suicide attempt by overdose on alcohol and medications and illicit drugs.  They deny any recent thoughts of suicide or concerns about depressed mood.  They have been participating in individual therapy that was available to students at [facility].  This was with Corwin Galeano and the patient reports finding therapy helpful.  Since transferring to [facility], they have been hoping to reestablish with a therapist that will be knowledgeable about transgender care.      SLEEP \"Sleep is good\" - 6 to 8 hours nightly   DIET Vegetarian - goal to become vegan; reports good appetite and is hungry during appointment.  Preciously experienced increased appetite and associated distress while taking Vyvanse (and changed medication to Dexedrine as a result).   EXERCISE Recently exercising more and hopes to lose weight (has gained some weight while taking hormone medications   COMMUNITY Recently making friends and feeling more supported by recent therapist  (Corwin Galeano) at [facility] (transferring to [facility] this semester and looking for new therapist (ideally transgender therapist) and also looking into support group for gender-affirming/trans community members at [facility].  Previously experienced isolation related to social anxiety and has stopped communicating with family members after leaving home at 19 years old.   SENSE OF PURPOSE Recently enrolled in [facility] [employer] (starting this semester) and will be taking classes in arts, gender studies, and social work/psychology.         PSYCHIATRIC REVIEW OF SYSTEMS:      MOOD SYMPTOMS:   Pertinent positives - neg      ANXIETY:   Pertinent positives - anxiety and depression/anxiety affecting " progress    Pertinent negatives - no excessive worry    SLEEP:   Pertinent negatives - no difficulty falling asleep, no premature morning awakening, no decreased need for sleep and no excessive daytime sleepiness     PSYCHOMOTOR/ENERGY:   Pertinent positives - increased energy    EATING:   Pertinent positives: weight gain, extreme fear of weight gain and disordered mealtime behaviors    Pertinent negatives: no decreased appetite and no increased appetite    COGNITIVE:   Pertinent positives: distractible     BEHAVIOR:   Pertinent positives - increased goal-directed activity    PSYCHOSIS:   SOCIAL:   Pertinent positives - family conflict, social withdrawal and phobias    Pertinent negatives - no misbehaving with peers and no history of withdrawal symptoms    SENSORY:          REVIEW OF SYSTEMS    Review of Systems   Constitutional:  Positive for weight gain and weight loss. Negative for decreased appetite and malaise/fatigue.   Neurological:  Positive for headaches. Negative for excessive daytime sleepiness.   Psychiatric/Behavioral:  Positive for substance abuse. Negative for depression, hallucinations and suicidal ideas. The patient is nervous/anxious. The patient does not have insomnia.    All other systems reviewed and are negative.  Reports using psychedelic mushrooms and ecstasy on occasion (denies using while taking stimulant medications - discussed risks of drug interactions) and occasional alcohol use.  Denies using cannabis or other drugs recently.             PAST PSYCHIATRIC MEDICATIONS CURRENT MEDICATIONS:   Vyvanse (stopped taking due to increased appetite as medication wore off at end of day).   Dextroamphetamine spansules, 15 mg po daily    spironolactone, 50 mg, Oral, BID  Progesterone 100 mg po daily    estradiol, 4 mg, Oral, DAILY    Cyanocobalamin (VITAMIN B12 PO), Take  by mouth.    VITAMIN D PO, Take  by mouth.  bicalutamide (CASODEX) 50 MG Tab  Sumatriptan as needed for migraine headaches    "      PAST PSYCHIATRIC HISTORY    Prior ? hospitalization: one previous hospitalization during their 20s following suicide attempt     Prior Self harm/suicide attempt: One suicide attempt by overdose (medications, alcohol, and illicit drugs) - patient was in their 20s and also report reckless and dangerous behaviors during that period and felt unconcerned about consequences.  They deny significant direct self-harm behaviors, including cutting.    Prior ? Diagnoses:   Attention deficit hyperactivity disorder (ADHD), predominantly inattentive type   Recurrent major depressive disorder, in partial remission   Anxiety with panic attacks      Past Medical History:   Diagnosis Date    Seizure (HCC)     seizure like episodes twice as teenager and early 20s, not evaluated further    Migraine headaches-takes sumatriptan as needed  Transgender individual who takes hormones for gender affirming treatment.  They are scheduled for an evaluation to discuss possible orchiectomy.      Past Surgical History:   Procedure Laterality Date    DENTAL SURGERY         ALLERGIES:  Patient has no known allergies to food or medications.  Reports allergic reaction to cat hair.      SUBSTANCE USE HISTORY  ALCOHOL: currently drinks socially; previously used excessive alcohol in social settings during their teens through early 30s.  They have recently found that alcohol feels \"less rewarding\" but also attributes this to feeling more comfortable around other people at baseline.  THC: reports taking cannabis edibles for special occasions (once every 1-2 months)  STIMULANTS: denies  HALLUCINOGENS: endorses using \"magic mushrooms\" regularly in the past with decreased frequency recently (no longer has way to get them since moving to [place])  OTHER: history of MDMA use in the past      FAMILY HISTORY    No family history on file.       SOCIAL HISTORY  Childhood: born in [place] and describes childhood as difficult and described problems with family " "relationships.  They left home at age 19 and no longer communicate with family members.    Education: They have been a student at [employer] recently and will be transferring to [employer] this upcoming semester.  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      ===============================================================  OBJECTIVE  PHYSICAL EXAMINAION:  Vital signs: /65   Pulse 60   Musculoskeletal: Normal gait.   Abnormal movements: No tics or muscle spasms     MENTAL STATUS EXAMINATION      General:   - Grooming and hygiene: Good and Casual  - Apparent distress: NAD   - Behavior: Calm  - Eye Contact:  Good  - no psychomotor agitation or retardation   - Participation: Active verbal participation, Attentive, Engaged, and Open to feedback  Orientation:Alert and Fully Oriented to person, place and time  Mood: \"Pretty good\"  Affect: Flexible, Full range, Congruent with content, and Congruent to stated mood  Thought Process: Linear, Logical, Goal-directed, and Circumstantial  Thought Content: Within normal limits and suicidal - no and homicidal - no  Perception: Denies auditory or visual hallucinations. No delusions noted Within normal limits  Attention span and concentration: Intact   Speech:Clear with normal rate and rhythm  Language: Appropriate spontaneous, comprehends spoken commands, and fluent  Insight: Good  Judgment:  Good  Recent and remote memory: No gross evidence of memory deficits          MEDICAL RECORDS/LABS/DIAGNOSTIC TESTS REVIEWED:    Hospital Outpatient Visit on 01/20/2022   Component Date Value    Cortisol 01/20/2022 6.5     " Testosterone,Total 01/20/2022 387     Sex Hormone Bind Globulin 01/20/2022 26     Free Testosterone 01/20/2022 80     Testosterone % Free 01/20/2022 2.1     C. trachomatis by PCR 01/20/2022 Negative     N. gonorrhoeae by PCR 01/20/2022 Negative     Source 01/20/2022 Urine     HIV Ag/Ab Combo Assay 01/20/2022 Non-Reactive     Syphilis, Treponemal Qual 01/20/2022 Non-Reactive     25-Hydroxy   Vitamin D 25 01/20/2022 32     TSH 01/20/2022 1.700     Cholesterol,Tot 01/20/2022 207 (H)     Triglycerides 01/20/2022 109     HDL 01/20/2022 60     LDL 01/20/2022 125 (H)     Glycohemoglobin 01/20/2022 5.2     Est Avg Glucose 01/20/2022 103     Sodium 01/20/2022 134 (L)     Potassium 01/20/2022 4.7     Chloride 01/20/2022 99     Co2 01/20/2022 25     Anion Gap 01/20/2022 10.0     Glucose 01/20/2022 87     Bun 01/20/2022 8     Creatinine 01/20/2022 0.66     Calcium 01/20/2022 9.5     AST(SGOT) 01/20/2022 16     ALT(SGPT) 01/20/2022 26     Alkaline Phosphatase 01/20/2022 114 (H)     Total Bilirubin 01/20/2022 0.8     Albumin 01/20/2022 4.6     Total Protein 01/20/2022 7.2     Globulin 01/20/2022 2.6     A-G Ratio 01/20/2022 1.8     WBC 01/20/2022 8.0     RBC 01/20/2022 5.11     Hemoglobin 01/20/2022 15.0     Hematocrit 01/20/2022 44.4     MCV 01/20/2022 86.9     MCH 01/20/2022 29.4     MCHC 01/20/2022 33.8     RDW 01/20/2022 38.6     Platelet Count 01/20/2022 314     MPV 01/20/2022 10.6     Neutrophils-Polys 01/20/2022 53.30     Lymphocytes 01/20/2022 36.20     Monocytes 01/20/2022 8.10     Eosinophils 01/20/2022 1.80     Basophils 01/20/2022 0.50     Immature Granulocytes 01/20/2022 0.10     Nucleated RBC 01/20/2022 0.00     Neutrophils (Absolute) 01/20/2022 4.24     Lymphs (Absolute) 01/20/2022 2.88     Monos (Absolute) 01/20/2022 0.64     Eos (Absolute) 01/20/2022 0.14     Baso (Absolute) 01/20/2022 0.04     Immature Granulocytes (a* 01/20/2022 0.01     NRBC (Absolute) 01/20/2022 0.00     Fasting Status 01/20/2022 Fasting      GFR If  01/20/2022 >60     GFR If Non  Ameri* 01/20/2022 >60          [place]  records -   Reviewed-no current concerns      ===============================================================  ASSESSMENT  This is a 37 y.o. old adult with a history of depression, anxiety with panic attacks, ADHD, and previous substance abuse related to depression and anxiety.  Their primary concern today is to reestablish care for medication management and for refills on current medication.  They have been taking dextroamphetamine to treat ADHD and have found this to be effective at managing their symptoms.Their depression became worse in their 20s and they were hospitalized 1 time following a suicide attempt by overdose.  Mood has improved since they have become more comfortable with their transgender identity and developed support from friends and community.  They have also been seeing a therapist while enrolled at [facility] that has contributed to these improvements in anxiety and depression and denied current concerns.  They would like to find a gender affirming therapist and also plan to attend a transgender support group at [facility].  Overall, they feel stable on current medication for ADHD and would like to continue without changes today.    SAFETY ASSESSMENT - SELF:     Does patient acknowledge current or past symptoms of dangerousness to self? Endorses past history of suicide attempt more than 7 years ago; denies current thoughts about suicide  History of suicide by family member: Patient does not communicate with family and left home 18 years ago  History of suicide by friend/significant other: Denies  Recent change in amount/specificity/intensity of suicidal thoughts or self-harm behavior? Denies thoughts about self-harm  Current access to firearms, medications, or other identified means of suicide/self-harm? Patient is taking medications as prescribed  If yes, willing  to restrict access to means of suicide/self-harm? n/a  Protective factors present: yes - recent therapy and social support from friends; future-oriented and transferring to [place]; optimistic outlook for future     SAFETY ASSESSMENT - OTHERS:   Does patient acknowledge current or past symptoms of aggressive behavior or risk to others? denies  Recent change in amount/specificity/intensity of thoughts or threats to harm others? n/a  Current access to firearms/other identified means of harm? n/a  If yes, willing to restrict access to weapons/means of harm? n/a     CURRENT RISK:   Suicidal: Moderate  Homicidal: Moderate  Self-Harm: Low  Relapse: Moderate  Crisis Safety Plan Reviewed: Not Indicated     DIAGNOSES  1. Attention deficit hyperactivity disorder (ADHD), predominantly inattentive type            PLAN:          Psychotherapy: Patient is currently looking for a gender-affirming or transgender therapist       Medications:  Continue dextroamphetamine spansules 15 mg po daily             Medication options, alternatives (including no medications) and medication risks/benefits/side effects were discussed in detail.  Explained importance of contraceptive measures while on psychotropic medications, educated to let provider know if ever pregnant or wanting to become pregnant. Verbalized understanding.  The patient was advised to call, message provider on [employer], or come in to the clinic if symptoms worsen or if any future questions/issues regarding their medications arise; the patient verbalized understanding and agreement.    The patient was educated to call 911, call the suicide hotline, or go to local ER if having thoughts of suicide or homicide; verbalized understanding.        Return to clinic in 8 weeks or sooner if symptoms worsen.  Next Appointment:  instruction provided on how to make the next appointment.     The proposed treatment plan was discussed with the patient who was provided the opportunity to ask  questions and make suggestions regarding alternative treatment. Patient verbalized understanding and expressed agreement with the plan.         Crow Tee M.D.  07/18/23      This note was created using voice recognition software (Dragon). The accuracy of the dictation is limited by the abilities of the software. I have reviewed the note prior to signing, however some errors in grammar and context are still possible. If you have any questions related to this note please do not hesitate to contact our office.

## 2023-07-18 ASSESSMENT — LIFESTYLE VARIABLES: SUBSTANCE_ABUSE: 1

## 2023-07-18 ASSESSMENT — ENCOUNTER SYMPTOMS
DISORDERED MEALTIME BEHAVIORS: 1
INCREASED GOAL-DIRECTED ACTIVITY: 1
EXCESSIVE DAYTIME SLEEPINESS: 0
INSOMNIA: 0
WEIGHT LOSS: 1
DECREASED NEED FOR SLEEP: 0
PHOBIAS: 1
HALLUCINATIONS: 0
DIFFICULTY FALLING ASLEEP: 0
NERVOUS/ANXIOUS: 1
WEIGHT GAIN: 1
DEPRESSION: 0
PREMATURE MORNING AWAKENING: 0
EXTREME FEAR OF WEIGHT GAIN: 1
INCREASED ENERGY: 1
DECREASED APPETITE: 0

## 2023-07-18 ASSESSMENT — SOCIAL DETERMINANTS OF HEALTH (SDOH): ANXIETY ASSOCIATED WITH SOCIAL SUPPORT SYSTEM: 1

## 2023-07-19 PROBLEM — Z79.899 TRANSGENDER PERSON ON HORMONE THERAPY: Status: ACTIVE | Noted: 2023-07-19

## 2023-07-19 PROBLEM — F41.9 ANXIETY AND DEPRESSION: Chronic | Status: ACTIVE | Noted: 2022-01-13

## 2023-07-19 PROBLEM — F64.0 TRANSGENDER PERSON ON HORMONE THERAPY: Status: ACTIVE | Noted: 2023-07-19

## 2023-07-19 PROBLEM — F32.A ANXIETY AND DEPRESSION: Chronic | Status: ACTIVE | Noted: 2022-01-13

## 2023-07-19 PROBLEM — F64.9 GENDER DYSPHORIA: Status: ACTIVE | Noted: 2023-07-19

## 2023-07-19 ASSESSMENT — ENCOUNTER SYMPTOMS: HEADACHES: 1

## 2023-07-21 PROBLEM — F33.42 MDD (MAJOR DEPRESSIVE DISORDER), RECURRENT, IN FULL REMISSION (HCC): Status: ACTIVE | Noted: 2021-11-09

## 2023-09-22 ENCOUNTER — OFFICE VISIT (OUTPATIENT)
Dept: BEHAVIORAL HEALTH | Facility: CLINIC | Age: 38
End: 2023-09-22
Payer: COMMERCIAL

## 2023-09-22 DIAGNOSIS — Z79.899 TRANSGENDER PERSON ON HORMONE THERAPY: ICD-10-CM

## 2023-09-22 DIAGNOSIS — F41.9 ANXIETY AND DEPRESSION: Chronic | ICD-10-CM

## 2023-09-22 DIAGNOSIS — F64.0 TRANSGENDER PERSON ON HORMONE THERAPY: ICD-10-CM

## 2023-09-22 DIAGNOSIS — F90.0 ATTENTION DEFICIT HYPERACTIVITY DISORDER (ADHD), PREDOMINANTLY INATTENTIVE TYPE: ICD-10-CM

## 2023-09-22 DIAGNOSIS — F32.A ANXIETY AND DEPRESSION: Chronic | ICD-10-CM

## 2023-09-22 PROCEDURE — 99214 OFFICE O/P EST MOD 30 MIN: CPT | Mod: GC | Performed by: STUDENT IN AN ORGANIZED HEALTH CARE EDUCATION/TRAINING PROGRAM

## 2023-09-22 RX ORDER — DEXTROAMPHETAMINE SULFATE 15 MG/1
15 CAPSULE, EXTENDED RELEASE ORAL DAILY
Qty: 30 CAPSULE | Refills: 0 | Status: SHIPPED | OUTPATIENT
Start: 2023-10-27 | End: 2023-10-16 | Stop reason: SDUPTHER

## 2023-09-22 RX ORDER — DEXTROAMPHETAMINE SULFATE 15 MG/1
15 CAPSULE, EXTENDED RELEASE ORAL DAILY
Qty: 30 CAPSULE | Refills: 0 | Status: SHIPPED | OUTPATIENT
Start: 2023-11-26 | End: 2023-10-16 | Stop reason: SDUPTHER

## 2023-09-25 NOTE — PROGRESS NOTES
Evaluation completed by: Crow Tee M.D.   Date of Service: 09/22/23   Appointment type: in-office appointment.    Information below was collected from: patient      CHIEF COMPLIANT:  Medication Management        HPI:   Janneth Mariano is a 37 y.o. old adult who presents today for follow up to address Medication Management  Patient has no new concerns related to ADHD or therapy.  There is an upcoming surgery for orchiectomy and patient had requested a letter to support this decision, but has since found out that the insurance coverage/treatment plan had been approved with inclusion of letters from other sources, including current psychotherapist.  Otherwise doing well but busy with school and work.  Not enjoying work and considering new job.  Appointment today started late and patient expressed irritation about time lost.  They are requesting refills for Dexedrine.    CURRENT MEDICATIONS    Current Outpatient Medications:     [START ON 10/27/2023] dextroamphetamine (DEXEDRINE) 15 MG SR capsule, Take 1 Capsule by mouth every day for 30 days. Indications: Attention Deficit Hyperactivity Disorder, Disp: 30 Capsule, Rfl: 0    [START ON 11/26/2023] dextroamphetamine (DEXEDRINE) 15 MG SR capsule, Take 1 Capsule by mouth every day for 30 days. Indications: Attention Deficit Hyperactivity Disorder, Disp: 30 Capsule, Rfl: 0    bicalutamide (CASODEX) 50 MG Tab, , Disp: , Rfl:     estradiol (ESTRACE) 2 MG Tab, , Disp: , Rfl:     finasteride (PROSCAR) 5 MG Tab, , Disp: , Rfl:     progesterone (PROMETRIUM) 100 MG Cap, , Disp: , Rfl:     SUMAtriptan (IMITREX) 50 MG Tab, , Disp: , Rfl:     dextroamphetamine (DEXEDRINE SPANSULE) 15 MG SR capsule, Take 1 Capsule by mouth every day for 90 days., Disp: 90 Capsule, Rfl: 0    Cyanocobalamin (VITAMIN B12 PO), Take  by mouth., Disp: , Rfl:     VITAMIN D PO, Take  by mouth., Disp: , Rfl:     Psychiatric Review of Systems:  Denies current symptoms      Medical ROS: negative      PAST  "PSYCHIATRIC MEDICATIONS CURRENT MEDICATIONS:   Vyvanse (stopped taking due to increased appetite as medication wore off at end of day).   Dextroamphetamine spansules, 15 mg po daily    spironolactone, 50 mg, Oral, BID  Progesterone 100 mg po daily    estradiol, 4 mg, Oral, DAILY    Cyanocobalamin (VITAMIN B12 PO), Take  by mouth.    VITAMIN D PO, Take  by mouth.  bicalutamide (CASODEX) 50 MG Tab  Sumatriptan as needed for migraine headaches         PAST PSYCHIATRIC HISTORY    Prior ? hospitalization: one previous hospitalization during their 20s following suicide attempt     Prior Self harm/suicide attempt: One suicide attempt by overdose (medications, alcohol, and illicit drugs) - patient was in their 20s and also report reckless and dangerous behaviors during that period and felt unconcerned about consequences.  They deny significant direct self-harm behaviors, including cutting.    Prior ? Diagnoses:   Attention deficit hyperactivity disorder (ADHD), predominantly inattentive type   Recurrent major depressive disorder, in partial remission   Anxiety with panic attacks      Past Medical History:   Diagnosis Date    Seizure (HCC)     seizure like episodes twice as teenager and early 20s, not evaluated further    Migraine headaches-takes sumatriptan as needed  Transgender individual who takes hormones for gender affirming treatment.  They are scheduled for an evaluation to discuss possible orchiectomy.      Past Surgical History:   Procedure Laterality Date    DENTAL SURGERY         ALLERGIES:  Patient has no known allergies to food or medications.  Reports allergic reaction to cat hair.      SUBSTANCE USE HISTORY  ALCOHOL: currently drinks socially; previously used excessive alcohol in social settings during their teens through early 30s.  They have recently found that alcohol feels \"less rewarding\" but also attributes this to feeling more comfortable around other people at baseline.  THC: reports taking cannabis " "edibles for special occasions (once every 1-2 months)  STIMULANTS: denies  HALLUCINOGENS: endorses using \"magic mushrooms\" regularly in the past with decreased frequency recently (no longer has way to get them since moving to [place])  OTHER: history of MDMA use in the past      FAMILY HISTORY    No family history on file.       SOCIAL HISTORY  Childhood: born in [place] and describes childhood as difficult and described problems with family relationships.  They left home at age 19 and no longer communicate with family members.    Education: They have been a student at [employer] recently and will be transferring to [employer] this upcoming semester.  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        PSYCHIATRIC EXAMINATION   Vitals: There were no vitals taken for this visit.  Musculoskeletal: No abnormal movements noted  Abnormal Movements: none  Gait:within normal limits      General:   - Grooming and hygiene: Casual  - Apparent distress: NAD   - Behavior: Calm and Tense  - Eye Contact:  Good  - no psychomotor agitation or retardation   - Participation: Active verbal participation, Attentive, Engaged, and Open to feedback  Orientation:Alert and Fully Oriented to person, place and time  Mood: \"irritated because we started late\"  Affect: Congruent with content, Congruent to stated mood, Constricted, and irritated  Thought Process: Linear, Logical, and Goal-directed  Thought Content: Within normal limits  Perception: Denies auditory or visual hallucinations. No delusions noted Within normal limits  Attention span and concentration: Intact   Speech:Clear with " normal rate and rhythm  Language: Appropriate spontaneous, comprehends spoken commands, and fluent  Insight: Good  Judgment:  Adequate  Recent and remote memory: No gross evidence of memory deficits      MEDICAL RECORDS/LABS/DIAGNOSTIC TESTS REVIEWED:    Hospital Outpatient Visit on 01/20/2022   Component Date Value    Cortisol 01/20/2022 6.5     Testosterone,Total 01/20/2022 387     Sex Hormone Bind Globulin 01/20/2022 26     Free Testosterone 01/20/2022 80     Testosterone % Free 01/20/2022 2.1     C. trachomatis by PCR 01/20/2022 Negative     N. gonorrhoeae by PCR 01/20/2022 Negative     Source 01/20/2022 Urine     HIV Ag/Ab Combo Assay 01/20/2022 Non-Reactive     Syphilis, Treponemal Qual 01/20/2022 Non-Reactive     25-Hydroxy   Vitamin D 25 01/20/2022 32     TSH 01/20/2022 1.700     Cholesterol,Tot 01/20/2022 207 (H)     Triglycerides 01/20/2022 109     HDL 01/20/2022 60     LDL 01/20/2022 125 (H)     Glycohemoglobin 01/20/2022 5.2     Est Avg Glucose 01/20/2022 103     Sodium 01/20/2022 134 (L)     Potassium 01/20/2022 4.7     Chloride 01/20/2022 99     Co2 01/20/2022 25     Anion Gap 01/20/2022 10.0     Glucose 01/20/2022 87     Bun 01/20/2022 8     Creatinine 01/20/2022 0.66     Calcium 01/20/2022 9.5     AST(SGOT) 01/20/2022 16     ALT(SGPT) 01/20/2022 26     Alkaline Phosphatase 01/20/2022 114 (H)     Total Bilirubin 01/20/2022 0.8     Albumin 01/20/2022 4.6     Total Protein 01/20/2022 7.2     Globulin 01/20/2022 2.6     A-G Ratio 01/20/2022 1.8     WBC 01/20/2022 8.0     RBC 01/20/2022 5.11     Hemoglobin 01/20/2022 15.0     Hematocrit 01/20/2022 44.4     MCV 01/20/2022 86.9     MCH 01/20/2022 29.4     MCHC 01/20/2022 33.8     RDW 01/20/2022 38.6     Platelet Count 01/20/2022 314     MPV 01/20/2022 10.6     Neutrophils-Polys 01/20/2022 53.30     Lymphocytes 01/20/2022 36.20     Monocytes 01/20/2022 8.10     Eosinophils 01/20/2022 1.80     Basophils 01/20/2022 0.50     Immature Granulocytes 01/20/2022  0.10     Nucleated RBC 01/20/2022 0.00     Neutrophils (Absolute) 01/20/2022 4.24     Lymphs (Absolute) 01/20/2022 2.88     Monos (Absolute) 01/20/2022 0.64     Eos (Absolute) 01/20/2022 0.14     Baso (Absolute) 01/20/2022 0.04     Immature Granulocytes (a* 01/20/2022 0.01     NRBC (Absolute) 01/20/2022 0.00     Fasting Status 01/20/2022 Fasting     GFR If  01/20/2022 >60     GFR If Non  Ameri* 01/20/2022 >60          [place]  records -   Reviewed-no current concerns      ===============================================================  ASSESSMENT  This is a 37 y.o. old adult with a history of depression, anxiety with panic attacks, ADHD, and previous substance abuse related to depression and anxiety.  Their primary concern today is for refills on current medication.  They have been taking dextroamphetamine to treat ADHD and have found this to be effective at managing their symptoms.  Upcoming scheduled surgery for gender-affirming orchiectomy and has received approval by insurance.  Busy with school and work.  Overall, they feel stable on current medication for ADHD and would like to continue without changes today.    SAFETY ASSESSMENT - SELF:     Does patient acknowledge current or past symptoms of dangerousness to self? Endorses past history of suicide attempt more than 7 years ago; denies current thoughts about suicide  History of suicide by family member: Patient does not communicate with family and left home 18 years ago  History of suicide by friend/significant other: Denies  Recent change in amount/specificity/intensity of suicidal thoughts or self-harm behavior? Denies thoughts about self-harm  Current access to firearms, medications, or other identified means of suicide/self-harm? Patient is taking medications as prescribed  If yes, willing to restrict access to means of suicide/self-harm? n/a  Protective factors present: yes - recent therapy and social support from friends;  future-oriented and transferring to [employer]; optimistic outlook for future     SAFETY ASSESSMENT - OTHERS:   Does patient acknowledge current or past symptoms of aggressive behavior or risk to others? denies  Recent change in amount/specificity/intensity of thoughts or threats to harm others? n/a  Current access to firearms/other identified means of harm? n/a  If yes, willing to restrict access to weapons/means of harm? n/a     CURRENT RISK:   Suicidal: Moderate  Homicidal: Moderate  Self-Harm: Low  Relapse: Moderate  Crisis Safety Plan Reviewed: Not Indicated     DIAGNOSES  1. Attention deficit hyperactivity disorder (ADHD), predominantly inattentive type            PLAN:          Psychotherapy: Continue with therapist       Medications:  Continue dextroamphetamine spansules 15 mg po daily             Medication options, alternatives (including no medications) and medication risks/benefits/side effects were discussed in detail.  Explained importance of contraceptive measures while on psychotropic medications, educated to let provider know if ever pregnant or wanting to become pregnant. Verbalized understanding.  The patient was advised to call, message provider on [employer], or come in to the clinic if symptoms worsen or if any future questions/issues regarding their medications arise; the patient verbalized understanding and agreement.    The patient was educated to call 911, call the suicide hotline, or go to local ER if having thoughts of suicide or homicide; verbalized understanding.        Return to clinic in 12 weeks or sooner if symptoms worsen.  Next Appointment:  instruction provided on how to make the next appointment.     The proposed treatment plan was discussed with the patient who was provided the opportunity to ask questions and make suggestions regarding alternative treatment. Patient verbalized understanding and expressed agreement with the plan.         Crow Tee M.D.

## 2023-10-16 ENCOUNTER — APPOINTMENT (OUTPATIENT)
Dept: BEHAVIORAL HEALTH | Facility: CLINIC | Age: 38
End: 2023-10-16
Payer: COMMERCIAL

## 2023-10-16 DIAGNOSIS — F90.0 ATTENTION DEFICIT HYPERACTIVITY DISORDER (ADHD), PREDOMINANTLY INATTENTIVE TYPE: ICD-10-CM

## 2023-10-16 RX ORDER — DEXTROAMPHETAMINE SULFATE 15 MG/1
15 CAPSULE, EXTENDED RELEASE ORAL DAILY
Qty: 90 CAPSULE | Refills: 0 | Status: SHIPPED | OUTPATIENT
Start: 2023-10-16 | End: 2024-01-14

## 2023-10-16 NOTE — PROGRESS NOTES
Patient is requesting a 90-day refill for Dexedrine 15 mg po daily.  Last appointment was on 9/22.  Patient has been recovering from recent surgery and denies major concerns at this time.  Will schedule follow up in 3 months.

## 2023-11-06 ENCOUNTER — OFFICE VISIT (OUTPATIENT)
Dept: BEHAVIORAL HEALTH | Facility: CLINIC | Age: 38
End: 2023-11-06
Payer: COMMERCIAL

## 2023-11-06 ENCOUNTER — DOCUMENTATION (OUTPATIENT)
Dept: BEHAVIORAL HEALTH | Facility: CLINIC | Age: 38
End: 2023-11-06
Payer: COMMERCIAL

## 2023-11-06 DIAGNOSIS — F40.10 SOCIAL ANXIETY DISORDER: ICD-10-CM

## 2023-11-06 DIAGNOSIS — F33.1 MODERATE EPISODE OF RECURRENT MAJOR DEPRESSIVE DISORDER (HCC): ICD-10-CM

## 2023-11-06 DIAGNOSIS — F90.0 ATTENTION DEFICIT HYPERACTIVITY DISORDER (ADHD), PREDOMINANTLY INATTENTIVE TYPE: ICD-10-CM

## 2023-11-06 PROCEDURE — 99214 OFFICE O/P EST MOD 30 MIN: CPT | Mod: GC | Performed by: STUDENT IN AN ORGANIZED HEALTH CARE EDUCATION/TRAINING PROGRAM

## 2023-11-06 RX ORDER — FLUVOXAMINE MALEATE 25 MG
25 TABLET ORAL NIGHTLY
Qty: 30 TABLET | Refills: 1 | Status: SHIPPED | OUTPATIENT
Start: 2023-11-06 | End: 2024-01-05

## 2023-11-06 RX ORDER — DEXTROAMPHETAMINE SULFATE 5 MG/1
5 CAPSULE, EXTENDED RELEASE ORAL DAILY
Qty: 30 CAPSULE | Refills: 0 | Status: SHIPPED | OUTPATIENT
Start: 2023-11-06 | End: 2023-12-06

## 2023-11-06 RX ORDER — DEXTROAMPHETAMINE SULFATE 5 MG/1
5 CAPSULE, EXTENDED RELEASE ORAL DAILY
Qty: 30 CAPSULE | Refills: 0 | Status: SHIPPED | OUTPATIENT
Start: 2023-12-06 | End: 2024-01-05

## 2023-11-06 ASSESSMENT — PATIENT HEALTH QUESTIONNAIRE - PHQ9
8. MOVING OR SPEAKING SO SLOWLY THAT OTHER PEOPLE COULD HAVE NOTICED. OR THE OPPOSITE, BEING SO FIGETY OR RESTLESS THAT YOU HAVE BEEN MOVING AROUND A LOT MORE THAN USUAL: NOT AT ALL
SUM OF ALL RESPONSES TO PHQ QUESTIONS 1-9: 11
4. FEELING TIRED OR HAVING LITTLE ENERGY: SEVERAL DAYS
2. FEELING DOWN, DEPRESSED, IRRITABLE, OR HOPELESS: SEVERAL DAYS
5. POOR APPETITE OR OVEREATING: MORE THAN HALF THE DAYS
9. THOUGHTS THAT YOU WOULD BE BETTER OFF DEAD, OR OF HURTING YOURSELF: SEVERAL DAYS
3. TROUBLE FALLING OR STAYING ASLEEP OR SLEEPING TOO MUCH: NOT AT ALL
1. LITTLE INTEREST OR PLEASURE IN DOING THINGS: MORE THAN HALF THE DAYS
SUM OF ALL RESPONSES TO PHQ9 QUESTIONS 1 AND 2: 3
6. FEELING BAD ABOUT YOURSELF - OR THAT YOU ARE A FAILURE OR HAVE LET YOURSELF OR YOUR FAMILY DOWN: SEVERAL DAYS
7. TROUBLE CONCENTRATING ON THINGS, SUCH AS READING THE NEWSPAPER OR WATCHING TELEVISION: NEARLY EVERY DAY

## 2023-11-07 PROBLEM — F32.A ANXIETY AND DEPRESSION: Chronic | Status: RESOLVED | Noted: 2022-01-13 | Resolved: 2023-11-07

## 2023-11-07 PROBLEM — F33.42 MDD (MAJOR DEPRESSIVE DISORDER), RECURRENT, IN FULL REMISSION (HCC): Status: RESOLVED | Noted: 2021-11-09 | Resolved: 2023-11-07

## 2023-11-07 PROBLEM — F41.9 ANXIETY AND DEPRESSION: Chronic | Status: RESOLVED | Noted: 2022-01-13 | Resolved: 2023-11-07

## 2023-11-07 RX ORDER — SPIRONOLACTONE 50 MG/1
TABLET, FILM COATED ORAL
COMMUNITY
Start: 2023-10-24

## 2023-11-07 RX ORDER — ESTRADIOL VALERATE 20 MG/ML
INJECTION INTRAMUSCULAR
COMMUNITY
Start: 2023-10-12

## 2023-11-07 NOTE — PROGRESS NOTES
Evaluation completed by: Crow Tee M.D.   Date of Service: 11/6/2023   Appointment type: in-office appointment.    Information below was collected from: patient      CHIEF COMPLIANT:  Medication Management        HPI:   Janneth Mariano is a 37 y.o. old adult who presents today for follow up appointment to address Medication Management  They have been recovering from orchiectomy and report decreased overall activity and participation in hobbies while recovering.  This includes dance and gymnastics.  They have been having trouble focusing on schoolwork, which was previously improved with regular schedule/routine.  They are currently taking Dexetrine SR 15 mg and have been on this dose since April, when it was increased from 10 mg.  They say it is no longer effective for focus.  They have trouble sustaining focus and with executive function while at home, but their social anxiety prevents them from studying other places.  They also attribute some of these changes to hormonal changes related to orchiectomy.  They have tried medication for anxiety in the past, but only found Wellbutrin to be effective.  Otherwise they attribute depression and anxiety to untreated ADHD.  They are willing to try medication to address anxiety, and we discussed using an increased dose of Dexedrine as a way to help get back into previous routine, which was helping with focus.  They are ok with this plan.    CURRENT MEDICATIONS    Current Outpatient Medications:     dextroamphetamine (DEXEDRINE SPANSULE) 5 MG SR capsule, Take 1 Capsule by mouth every day for 30 days. Indications: Attention Deficit Hyperactivity Disorder, Disp: 30 Capsule, Rfl: 0    [START ON 12/6/2023] dextroamphetamine (DEXEDRINE SPANSULE) 5 MG SR capsule, Take 1 Capsule by mouth every day for 30 days. Indications: Attention Deficit Hyperactivity Disorder, Disp: 30 Capsule, Rfl: 0    fluvoxamine (LUVOX) 25 MG tablet, Take 1 Tablet by mouth every evening for 60 days.  Indications: Major Depressive Disorder, Social anxiety disorder, Disp: 30 Tablet, Rfl: 1    dextroamphetamine (DEXEDRINE SPANSULE) 15 MG SR capsule, Take 1 Capsule by mouth every day for 90 days. Indications: Attention Deficit Hyperactivity Disorder, Disp: 90 Capsule, Rfl: 0    bicalutamide (CASODEX) 50 MG Tab, , Disp: , Rfl:     estradiol (ESTRACE) 2 MG Tab, , Disp: , Rfl:     finasteride (PROSCAR) 5 MG Tab, , Disp: , Rfl:     progesterone (PROMETRIUM) 100 MG Cap, , Disp: , Rfl:     SUMAtriptan (IMITREX) 50 MG Tab, , Disp: , Rfl:     Cyanocobalamin (VITAMIN B12 PO), Take  by mouth., Disp: , Rfl:     VITAMIN D PO, Take  by mouth., Disp: , Rfl:     Psychiatric Review of Systems:current symptoms as reported by pt.  Depression: Depressed mood, Anhedonia, Excessive feelings of guilt, Low energy, Higher than normal appetite, Difficulty concentrating, and Passive thoughts of death  Tisha: Distractibility  Anxiety/Panic Attacks: psychomotor agitation, difficulty concentrating, social anxiety that limits social interactions  Psychosis: Patient reports no signs or symptoms indicative of psychosis  ADHD: fails to give close attention to details or makes careless mistakes in school, has difficulty sustaining attention in tasks or play activities, has difficulty organizing tasks and activities, does not follow through on instructions and fails to finish schoolwork, is easily distracted by extraneous stimuli, avoids engaging in tasks that require sustained attention, fidgets with hands or feet or squirms in seat  Tics/Abnormal movements: denies  Eating Disorders: Increased appetite, Fear of weight gain/fat, and Other: binge eating  Sleep: denies insomnia  Behavioral/Aggression: Tense  Substance Use: denies substance use    MEDICAL REVIEW OF SYSTEMS   Genitourinary: recently recovering from surgery  Otherwise negative      MEDICAL HISTORY  Past Medical History:   Diagnosis Date    ADD (attention deficit disorder)     Anxiety      "Depression     Seizure (HCC)     seizure like episodes twice as teenager and early 20s, not evaluated further      Allergies:   No Known Allergies  @pre    SURGICAL HISTORY  Past Surgical History:   Procedure Laterality Date    ORCHIECTOMY BILATERAL Bilateral 10/2023    DENTAL SURGERY          FAMILY PSYCHIATRIC HISTORY  History reviewed. No pertinent family history.    SOCIAL HISTORY  Recently decreased participation in extracurricular activities, including dance and aerogymnastics, due to recovery from surgery.  Has felt less motivated and energized as a result.  Has had increase in social anxiety that limits ability to study outside of the home.    SUBSTANCE USE HISTORY  No changes    PSYCHIATRIC EXAMINATION   Vitals: There were no vitals taken for this visit.  Musculoskeletal: No abnormal movements noted  Abnormal Movements: hyperkinetic and fidgeting  Gait:within normal limits      General:   - Grooming and hygiene: Casual  - Apparent distress: NAD   - Behavior: Tense and Hyperactive  - Eye Contact:  Good  - no psychomotor agitation or retardation   - Participation: Active verbal participation, Attentive, Engaged, Open to feedback, and Defensive  Orientation:Alert and Fully Oriented to person, place and time  Mood: \"good\"  Affect: Flexible, Full range, Congruent with content, and Congruent to stated mood  Thought Process: Linear, Logical, and Goal-directed  Thought Content: Within normal limits  Perception: Denies auditory or visual hallucinations. No delusions noted Within normal limits  Attention span and concentration: Intact   Speech:Clear with normal rate and rhythm  Language: Appropriate spontaneous, comprehends spoken commands, and fluent  Insight: Adequate  Judgment:  Adequate  Recent and remote memory: No gross evidence of memory deficits    LABS:  Hospital Outpatient Visit on 01/20/2022   Component Date Value    Cortisol 01/20/2022 6.5     Testosterone,Total 01/20/2022 387     Sex Hormone Bind " Globulin 01/20/2022 26     Free Testosterone 01/20/2022 80     Testosterone % Free 01/20/2022 2.1     C. trachomatis by PCR 01/20/2022 Negative     N. gonorrhoeae by PCR 01/20/2022 Negative     Source 01/20/2022 Urine     HIV Ag/Ab Combo Assay 01/20/2022 Non-Reactive     Syphilis, Treponemal Qual 01/20/2022 Non-Reactive     25-Hydroxy   Vitamin D 25 01/20/2022 32     TSH 01/20/2022 1.700     Cholesterol,Tot 01/20/2022 207 (H)     Triglycerides 01/20/2022 109     HDL 01/20/2022 60     LDL 01/20/2022 125 (H)     Glycohemoglobin 01/20/2022 5.2     Est Avg Glucose 01/20/2022 103     Sodium 01/20/2022 134 (L)     Potassium 01/20/2022 4.7     Chloride 01/20/2022 99     Co2 01/20/2022 25     Anion Gap 01/20/2022 10.0     Glucose 01/20/2022 87     Bun 01/20/2022 8     Creatinine 01/20/2022 0.66     Calcium 01/20/2022 9.5     AST(SGOT) 01/20/2022 16     ALT(SGPT) 01/20/2022 26     Alkaline Phosphatase 01/20/2022 114 (H)     Total Bilirubin 01/20/2022 0.8     Albumin 01/20/2022 4.6     Total Protein 01/20/2022 7.2     Globulin 01/20/2022 2.6     A-G Ratio 01/20/2022 1.8     WBC 01/20/2022 8.0     RBC 01/20/2022 5.11     Hemoglobin 01/20/2022 15.0     Hematocrit 01/20/2022 44.4     MCV 01/20/2022 86.9     MCH 01/20/2022 29.4     MCHC 01/20/2022 33.8     RDW 01/20/2022 38.6     Platelet Count 01/20/2022 314     MPV 01/20/2022 10.6     Neutrophils-Polys 01/20/2022 53.30     Lymphocytes 01/20/2022 36.20     Monocytes 01/20/2022 8.10     Eosinophils 01/20/2022 1.80     Basophils 01/20/2022 0.50     Immature Granulocytes 01/20/2022 0.10     Nucleated RBC 01/20/2022 0.00     Neutrophils (Absolute) 01/20/2022 4.24     Lymphs (Absolute) 01/20/2022 2.88     Monos (Absolute) 01/20/2022 0.64     Eos (Absolute) 01/20/2022 0.14     Baso (Absolute) 01/20/2022 0.04     Immature Granulocytes (a* 01/20/2022 0.01     NRBC (Absolute) 01/20/2022 0.00     Fasting Status 01/20/2022 Fasting     GFR If  01/20/2022 >60     GFR If Non   Ameri* 01/20/2022 >60           SAFETY ASSESSMENT - RISK TO SELF  Current suicide attempts or self harm: No  Past suicide attempts or self harm: Yes  Recent change in amount/specificity/intensity of suicidal thoughts or self-harm behavior: No  Ongoing substance use disorder: No  Protective factors present: Yes     SAFETY ASSESSMENT - RISK TO OTHERS  Current aggressive behavior or risk to others: No  Past aggressive behavior or risk to others: No       CURRENT RISK ASSESSMENT       Suicide: Low       Homicide: Low       Self-Harm: Low       Relapse: Not applicable       Crisis Safety Plan Reviewed Yes    [place]  records   reviewed.  No concerns about misuse of controlled substance.    ASSESSMENT  Janneth Mariano is a 37 y.o. old adult who presents today for follow up appointment to address Medication Management  They are having more trouble focusing, which has been more difficult since recent surgery and period of recovery that limited social and lifestyle factors, which have helped maintain routine and focus.  They also have trouble with social anxiety, which has previously been treated but ineffectively with other medications.  They recently received a 90 day prescription of Dexedrine 15 mg, but they would like to try increasing to 20 mg due to decrease in efficacy.  We discussed using an increased dose as a temporary adjustment while the patient is getting back into previous activities, like dance and gymnastics.  We will also try starting Luvox to help with social anxiety, depression, and potentiall to help with binge eating.  The patient agrees with these changes.  We will send a prescription of Dexedrine SR 5 mg daily to take with the recent refill of 15 mg.  We will start Luvox 25 mg po daily to assess for tolerability.  Patient plans to reach out to campus therapy resources, which will be covered by current insurance.  Also planning to change jobs or decrease hours, but has concerns about losing health  insurance.    DIAGNOSES/PLAN  1. Moderate episode of recurrent major depressive disorder (HCC)  - fluvoxamine (LUVOX) 25 MG tablet; Take 1 Tablet by mouth every evening for 60 days. Indications: Major Depressive Disorder, Social anxiety disorder  Dispense: 30 Tablet; Refill: 1    2. Social anxiety disorder  - fluvoxamine (LUVOX) 25 MG tablet; Take 1 Tablet by mouth every evening for 60 days. Indications: Major Depressive Disorder, Social anxiety disorder  Dispense: 30 Tablet; Refill: 1    3. Attention deficit hyperactivity disorder (ADHD), predominantly inattentive type  - dextroamphetamine (DEXEDRINE SPANSULE) 5 MG SR capsule; Take 1 Capsule by mouth every day for 30 days. Indications: Attention Deficit Hyperactivity Disorder  Dispense: 30 Capsule; Refill: 0  - dextroamphetamine (DEXEDRINE SPANSULE) 5 MG SR capsule; Take 1 Capsule by mouth every day for 30 days. Indications: Attention Deficit Hyperactivity Disorder  Dispense: 30 Capsule; Refill: 0   -patient will take with 15 mg capsules daily.    -Schedule therapy with campus resources.    Medication options, alternatives (including no medications) and medication risks/benefits/side effects were discussed in detail.  The patient was advised to call, message clinician on [employer], or come in to the clinic if symptoms worsen or if questions/issues regarding their medications arise.  The patient verbalized understanding and agreement.    The patient was educated to call 911, call the suicide hotline, or go to the local ER if having thoughts of suicide or homicide.  The patient verbalized understanding and agreement.   The proposed treatment plan was discussed with the patient who was provided the opportunity to ask questions and make suggestions regarding alternative treatment. Patient verbalized understanding and expressed agreement with the plan.      Return to clinic in 4 weeks or sooner if symptoms worsen.    This appointment was supervised by attending  psychiatrist, who agrees with assessment and treatment plan.  See attending attestation for more details.     Crow Tee M.D.

## 2023-12-06 ENCOUNTER — OFFICE VISIT (OUTPATIENT)
Dept: BEHAVIORAL HEALTH | Facility: CLINIC | Age: 38
End: 2023-12-06
Payer: COMMERCIAL

## 2023-12-06 DIAGNOSIS — F90.0 ATTENTION DEFICIT HYPERACTIVITY DISORDER (ADHD), PREDOMINANTLY INATTENTIVE TYPE: ICD-10-CM

## 2023-12-06 DIAGNOSIS — F41.9 ANXIETY: ICD-10-CM

## 2023-12-06 DIAGNOSIS — F33.1 MODERATE EPISODE OF RECURRENT MAJOR DEPRESSIVE DISORDER (HCC): ICD-10-CM

## 2023-12-06 PROBLEM — F33.41 RECURRENT MAJOR DEPRESSIVE DISORDER, IN PARTIAL REMISSION (HCC): Status: ACTIVE | Noted: 2023-12-06

## 2023-12-06 PROCEDURE — 99214 OFFICE O/P EST MOD 30 MIN: CPT | Performed by: STUDENT IN AN ORGANIZED HEALTH CARE EDUCATION/TRAINING PROGRAM

## 2023-12-06 RX ORDER — ATOMOXETINE 40 MG/1
40 CAPSULE ORAL DAILY
Qty: 90 CAPSULE | Refills: 0 | Status: SHIPPED | OUTPATIENT
Start: 2023-12-06 | End: 2023-12-20 | Stop reason: SDUPTHER

## 2023-12-07 NOTE — PROGRESS NOTES
Evaluation completed by: Crow Tee M.D.   Date of Service: 12/06/23   Appointment type: in-office appointment.    Information below was collected from: patient      CHIEF COMPLIANT:  Medication Management    HPI:   Janneth Mariano is a 38 y.o. old adult who presents today for follow up appointment to address Medication Management  Patient has a history of depression and ADHD.  Feels better overall which may be related to stabilization of hormones related to gender affirming surgery.  Began taking fluvoxamine at last appointment for depression and says mood has been better.  History of ADHD previously treated with Dexedrine and recently tried Vyvanse but found Dexedrine more effective.  Helping maintain focus for college finals.  He has noticed that Dexedrine is causing GERD and has started taking omeprazole.  Interested in trying a nonstimulant medication for ADHD due to this side effect, and would like to potentially start a new medication while on winter break after finals end.  Has also started seeing a therapist and has found this to be helpful  for the most part.    CURRENT MEDICATIONS    Current Outpatient Medications:     atomoxetine (STRATTERA) 40 MG capsule, Take 1 Capsule by mouth every day for 90 days., Disp: 90 Capsule, Rfl: 0    Estradiol Valerate 20 MG/ML Oil, , Disp: , Rfl:     spironolactone (ALDACTONE) 50 MG Tab, , Disp: , Rfl:     dextroamphetamine (DEXEDRINE SPANSULE) 5 MG SR capsule, Take 1 Capsule by mouth every day for 30 days. Indications: Attention Deficit Hyperactivity Disorder, Disp: 30 Capsule, Rfl: 0    dextroamphetamine (DEXEDRINE SPANSULE) 5 MG SR capsule, Take 1 Capsule by mouth every day for 30 days. Indications: Attention Deficit Hyperactivity Disorder, Disp: 30 Capsule, Rfl: 0    fluvoxamine (LUVOX) 25 MG tablet, Take 1 Tablet by mouth every evening for 60 days. Indications: Major Depressive Disorder, Social anxiety disorder, Disp: 30 Tablet, Rfl: 1    dextroamphetamine (DEXEDRINE  "SPANSULE) 15 MG SR capsule, Take 1 Capsule by mouth every day for 90 days. Indications: Attention Deficit Hyperactivity Disorder, Disp: 90 Capsule, Rfl: 0    bicalutamide (CASODEX) 50 MG Tab, , Disp: , Rfl:     estradiol (ESTRACE) 2 MG Tab, , Disp: , Rfl:     finasteride (PROSCAR) 5 MG Tab, , Disp: , Rfl:     progesterone (PROMETRIUM) 100 MG Cap, , Disp: , Rfl:     SUMAtriptan (IMITREX) 50 MG Tab, , Disp: , Rfl:     Cyanocobalamin (VITAMIN B12 PO), Take  by mouth., Disp: , Rfl:     VITAMIN D PO, Take  by mouth., Disp: , Rfl:     Psychiatric Review of Systems:current symptoms as reported by pt.  Denies insomnia  Endorses mild anxiety that contributes to procrastination    MEDICAL REVIEW OF SYSTEMS   Gastrointestinal: Positive for heartburn, nausea,   Otherwise negative      MEDICAL HISTORY  Past Medical History:   Diagnosis Date    ADD (attention deficit disorder)     Anxiety     Depression     Seizure (HCC)     seizure like episodes twice as teenager and early 20s, not evaluated further      Allergies:   No Known Allergies  @pre    SURGICAL HISTORY  Past Surgical History:   Procedure Laterality Date    ORCHIECTOMY BILATERAL Bilateral 10/2023    DENTAL SURGERY          FAMILY PSYCHIATRIC HISTORY  No family history on file.    SOCIAL HISTORY  Reviewed with patient and no changes.     SUBSTANCE USE HISTORY  No changes    PSYCHIATRIC EXAMINATION   Vitals: There were no vitals taken for this visit.  Musculoskeletal: No abnormal movements noted  Abnormal Movements: none  Gait:within normal limits      General:   - Grooming and hygiene: Casual  - Apparent distress: NAD   - Behavior: Calm  - Eye Contact:  Good  - no psychomotor agitation or retardation   - Participation: Active verbal participation, Attentive, Engaged, and Open to feedback  Orientation:Alert and Fully Oriented to person, place and time  Mood: \"ok\"  Affect: Flexible, Full range, Congruent with content, and Congruent to stated mood  Thought Process: Linear, " Logical, and Goal-directed  Thought Content: Within normal limits  Perception: Denies auditory or visual hallucinations. No delusions noted Within normal limits  Attention span and concentration: Intact   Speech:Clear with normal rate and rhythm  Language: Appropriate spontaneous, comprehends spoken commands, and fluent  Insight: Good  Judgment:  Good  Recent and remote memory: No gross evidence of memory deficits    LABS:  Hospital Outpatient Visit on 01/20/2022   Component Date Value    Cortisol 01/20/2022 6.5     Testosterone,Total 01/20/2022 387     Sex Hormone Bind Globulin 01/20/2022 26     Free Testosterone 01/20/2022 80     Testosterone % Free 01/20/2022 2.1     C. trachomatis by PCR 01/20/2022 Negative     N. gonorrhoeae by PCR 01/20/2022 Negative     Source 01/20/2022 Urine     HIV Ag/Ab Combo Assay 01/20/2022 Non-Reactive     Syphilis, Treponemal Qual 01/20/2022 Non-Reactive     25-Hydroxy   Vitamin D 25 01/20/2022 32     TSH 01/20/2022 1.700     Cholesterol,Tot 01/20/2022 207 (H)     Triglycerides 01/20/2022 109     HDL 01/20/2022 60     LDL 01/20/2022 125 (H)     Glycohemoglobin 01/20/2022 5.2     Est Avg Glucose 01/20/2022 103     Sodium 01/20/2022 134 (L)     Potassium 01/20/2022 4.7     Chloride 01/20/2022 99     Co2 01/20/2022 25     Anion Gap 01/20/2022 10.0     Glucose 01/20/2022 87     Bun 01/20/2022 8     Creatinine 01/20/2022 0.66     Calcium 01/20/2022 9.5     AST(SGOT) 01/20/2022 16     ALT(SGPT) 01/20/2022 26     Alkaline Phosphatase 01/20/2022 114 (H)     Total Bilirubin 01/20/2022 0.8     Albumin 01/20/2022 4.6     Total Protein 01/20/2022 7.2     Globulin 01/20/2022 2.6     A-G Ratio 01/20/2022 1.8     WBC 01/20/2022 8.0     RBC 01/20/2022 5.11     Hemoglobin 01/20/2022 15.0     Hematocrit 01/20/2022 44.4     MCV 01/20/2022 86.9     MCH 01/20/2022 29.4     MCHC 01/20/2022 33.8     RDW 01/20/2022 38.6     Platelet Count 01/20/2022 314     MPV 01/20/2022 10.6     Neutrophils-Polys  01/20/2022 53.30     Lymphocytes 01/20/2022 36.20     Monocytes 01/20/2022 8.10     Eosinophils 01/20/2022 1.80     Basophils 01/20/2022 0.50     Immature Granulocytes 01/20/2022 0.10     Nucleated RBC 01/20/2022 0.00     Neutrophils (Absolute) 01/20/2022 4.24     Lymphs (Absolute) 01/20/2022 2.88     Monos (Absolute) 01/20/2022 0.64     Eos (Absolute) 01/20/2022 0.14     Baso (Absolute) 01/20/2022 0.04     Immature Granulocytes (a* 01/20/2022 0.01     NRBC (Absolute) 01/20/2022 0.00     Fasting Status 01/20/2022 Fasting     GFR If  01/20/2022 >60     GFR If Non  Ameri* 01/20/2022 >60       CURRENT RISK ASSESSMENT       Suicide: Low       Homicide: Low       Self-Harm: Low       Relapse: Not applicable       Crisis Safety Plan Reviewed Yes     [place]  records   reviewed.  No concerns about misuse of controlled substance.      ASSESSMENT  Janneth Mariano is a 38 y.o. old adult who presents today for follow up appointment to address Medication Management  Patient reports improvement overall.  Mood and anxiety have been stable.  Studying for finals is going well overall taking Dexedrine.  However, patient noticed heartburn related to Dexedrine and would like to try different medications.  We discussed trying Strattera and patient agrees with this plan.  They have been seeing a psychotherapist through the [facility] and finds this to be helpful so far.  Plan is to continue Luvox at current dosage and the switch from Dexedrine to Strattera once finals are over during winter break.    DIAGNOSES/PLAN  Moderate episode of recurrent major depressive disorder (HCC)  Anxiety  -Continue fluvoxamine 25 mg p.o. daily  -Continue individual psychotherapy    Attention deficit hyperactivity disorder (ADHD), predominantly inattentive type  - Start taking atomoxetine (STRATTERA) 40 MG daily after finals  - Discontinue Dexedrine after finals when starting Strattera      Medication options, alternatives  (including no medications) and medication risks/benefits/side effects were discussed in detail.  The patient was advised to call, message clinician on [employer], or come in to the clinic if symptoms worsen or if questions/issues regarding their medications arise.  The patient verbalized understanding and agreement.    The patient was educated to call 911, call the suicide hotline, or go to the local ER if having thoughts of suicide or homicide.  The patient verbalized understanding and agreement.   The proposed treatment plan was discussed with the patient who was provided the opportunity to ask questions and make suggestions regarding alternative treatment. Patient verbalized understanding and expressed agreement with the plan.      Return to clinic in 2 months or sooner if symptoms worsen.    This appointment was supervised by attending psychiatrist, who agrees with assessment and treatment plan.  See attending attestation for more details.     Crow Tee M.D.

## 2023-12-20 DIAGNOSIS — F33.1 MODERATE EPISODE OF RECURRENT MAJOR DEPRESSIVE DISORDER (HCC): ICD-10-CM

## 2023-12-20 DIAGNOSIS — F90.0 ATTENTION DEFICIT HYPERACTIVITY DISORDER (ADHD), PREDOMINANTLY INATTENTIVE TYPE: ICD-10-CM

## 2023-12-20 NOTE — TELEPHONE ENCOUNTER
Nay pt. Pt prefers local pharmacy. Can you please send atomoxetine to [facility] in [place]? Pt never received the one sent to mail order.    Received request via: Patient    Was the patient seen in the last year in this department? Yes    Does the patient have an active prescription (recently filled or refills available) for medication(s) requested? No    Does the patient have alf Plus and need 100 day supply (blood pressure, diabetes and cholesterol meds only)? Medication is not for cholesterol, blood pressure or diabetes and Patient does not have SCP

## 2023-12-21 RX ORDER — ATOMOXETINE 40 MG/1
40 CAPSULE ORAL DAILY
Qty: 90 CAPSULE | Refills: 0 | Status: SHIPPED | OUTPATIENT
Start: 2023-12-21

## 2024-05-31 ENCOUNTER — APPOINTMENT (OUTPATIENT)
Dept: BEHAVIORAL HEALTH | Facility: CLINIC | Age: 39
End: 2024-05-31
Payer: COMMERCIAL